# Patient Record
Sex: MALE | Race: WHITE | NOT HISPANIC OR LATINO | Employment: UNEMPLOYED | URBAN - METROPOLITAN AREA
[De-identification: names, ages, dates, MRNs, and addresses within clinical notes are randomized per-mention and may not be internally consistent; named-entity substitution may affect disease eponyms.]

---

## 2022-02-11 ENCOUNTER — HISTORICAL (OUTPATIENT)
Dept: ADMINISTRATIVE | Facility: HOSPITAL | Age: 57
End: 2022-02-11

## 2022-11-30 ENCOUNTER — HOSPITAL ENCOUNTER (EMERGENCY)
Facility: HOSPITAL | Age: 57
Discharge: HOME | End: 2022-11-30
Attending: STUDENT IN AN ORGANIZED HEALTH CARE EDUCATION/TRAINING PROGRAM
Payer: MEDICAID

## 2022-11-30 VITALS
WEIGHT: 145 LBS | SYSTOLIC BLOOD PRESSURE: 136 MMHG | TEMPERATURE: 99 F | RESPIRATION RATE: 18 BRPM | HEART RATE: 80 BPM | DIASTOLIC BLOOD PRESSURE: 82 MMHG | BODY MASS INDEX: 21.48 KG/M2 | HEIGHT: 69 IN | OXYGEN SATURATION: 98 %

## 2022-11-30 DIAGNOSIS — R21 RASH AND NONSPECIFIC SKIN ERUPTION: Primary | ICD-10-CM

## 2022-11-30 DIAGNOSIS — N50.819 TESTICULAR PAIN: ICD-10-CM

## 2022-11-30 DIAGNOSIS — N45.1 EPIDIDYMITIS: ICD-10-CM

## 2022-11-30 LAB
ALBUMIN SERPL-MCNC: 3.1 GM/DL (ref 3.5–5)
ALBUMIN/GLOB SERPL: 0.8 RATIO (ref 1.1–2)
ALP SERPL-CCNC: 78 UNIT/L (ref 40–150)
ALT SERPL-CCNC: 9 UNIT/L (ref 0–55)
AMPHET UR QL SCN: POSITIVE
APPEARANCE UR: CLEAR
AST SERPL-CCNC: 11 UNIT/L (ref 5–34)
BARBITURATE SCN PRESENT UR: NEGATIVE
BASOPHILS # BLD AUTO: 0.06 X10(3)/MCL (ref 0–0.2)
BASOPHILS NFR BLD AUTO: 0.7 %
BENZODIAZ UR QL SCN: NEGATIVE
BILIRUB UR QL STRIP.AUTO: NEGATIVE MG/DL
BILIRUBIN DIRECT+TOT PNL SERPL-MCNC: 0.4 MG/DL
BUN SERPL-MCNC: 13.4 MG/DL (ref 8.4–25.7)
CALCIUM SERPL-MCNC: 9.5 MG/DL (ref 8.4–10.2)
CANNABINOIDS UR QL SCN: NEGATIVE
CHLORIDE SERPL-SCNC: 105 MMOL/L (ref 98–107)
CO2 SERPL-SCNC: 25 MMOL/L (ref 22–29)
COCAINE UR QL SCN: NEGATIVE
COLOR UR AUTO: YELLOW
CREAT SERPL-MCNC: 1.19 MG/DL (ref 0.73–1.18)
EOSINOPHIL # BLD AUTO: 0.29 X10(3)/MCL (ref 0–0.9)
EOSINOPHIL NFR BLD AUTO: 3.3 %
ERYTHROCYTE [DISTWIDTH] IN BLOOD BY AUTOMATED COUNT: 13.1 % (ref 11.5–17)
GFR SERPLBLD CREATININE-BSD FMLA CKD-EPI: >60 MLS/MIN/1.73/M2
GLOBULIN SER-MCNC: 3.7 GM/DL (ref 2.4–3.5)
GLUCOSE SERPL-MCNC: 96 MG/DL (ref 74–100)
GLUCOSE UR QL STRIP.AUTO: NEGATIVE MG/DL
HCT VFR BLD AUTO: 35.6 % (ref 42–52)
HGB BLD-MCNC: 11.1 GM/DL (ref 14–18)
IMM GRANULOCYTES # BLD AUTO: 0.02 X10(3)/MCL (ref 0–0.04)
IMM GRANULOCYTES NFR BLD AUTO: 0.2 %
KETONES UR QL STRIP.AUTO: NEGATIVE MG/DL
LEUKOCYTE ESTERASE UR QL STRIP.AUTO: NEGATIVE UNIT/L
LYMPHOCYTES # BLD AUTO: 1.41 X10(3)/MCL (ref 0.6–4.6)
LYMPHOCYTES NFR BLD AUTO: 16 %
MAGNESIUM SERPL-MCNC: 1.9 MG/DL (ref 1.6–2.6)
MCH RBC QN AUTO: 27 PG (ref 27–31)
MCHC RBC AUTO-ENTMCNC: 31.2 MG/DL (ref 33–36)
MCV RBC AUTO: 86.6 FL (ref 80–94)
MDMA UR QL SCN: NEGATIVE
MONOCYTES # BLD AUTO: 0.77 X10(3)/MCL (ref 0.1–1.3)
MONOCYTES NFR BLD AUTO: 8.7 %
NEUTROPHILS # BLD AUTO: 6.3 X10(3)/MCL (ref 2.1–9.2)
NEUTROPHILS NFR BLD AUTO: 71.1 %
NITRITE UR QL STRIP.AUTO: NEGATIVE
NRBC BLD AUTO-RTO: 0 %
OPIATES UR QL SCN: NEGATIVE
PCP UR QL: NEGATIVE
PH UR STRIP.AUTO: 6 [PH]
PH UR: 6 [PH] (ref 3–11)
PLATELET # BLD AUTO: 351 X10(3)/MCL (ref 130–400)
PMV BLD AUTO: 9.4 FL (ref 7.4–10.4)
POTASSIUM SERPL-SCNC: 4.2 MMOL/L (ref 3.5–5.1)
PROT SERPL-MCNC: 6.8 GM/DL (ref 6.4–8.3)
PROT UR QL STRIP.AUTO: NEGATIVE MG/DL
RBC # BLD AUTO: 4.11 X10(6)/MCL (ref 4.7–6.1)
RBC UR QL AUTO: NEGATIVE UNIT/L
SODIUM SERPL-SCNC: 138 MMOL/L (ref 136–145)
SP GR UR STRIP.AUTO: 1.02
SPECIFIC GRAVITY, URINE AUTO (.000) (OHS): 1.02 (ref 1–1.03)
TSH SERPL-ACNC: 0.32 UIU/ML (ref 0.35–4.94)
UROBILINOGEN UR STRIP-ACNC: 0.2 MG/DL
WBC # SPEC AUTO: 8.8 X10(3)/MCL (ref 4.5–11.5)

## 2022-11-30 PROCEDURE — 96372 THER/PROPH/DIAG INJ SC/IM: CPT | Performed by: STUDENT IN AN ORGANIZED HEALTH CARE EDUCATION/TRAINING PROGRAM

## 2022-11-30 PROCEDURE — 87591 N.GONORRHOEAE DNA AMP PROB: CPT | Performed by: STUDENT IN AN ORGANIZED HEALTH CARE EDUCATION/TRAINING PROGRAM

## 2022-11-30 PROCEDURE — 80307 DRUG TEST PRSMV CHEM ANLYZR: CPT | Performed by: STUDENT IN AN ORGANIZED HEALTH CARE EDUCATION/TRAINING PROGRAM

## 2022-11-30 PROCEDURE — 63600175 PHARM REV CODE 636 W HCPCS: Performed by: STUDENT IN AN ORGANIZED HEALTH CARE EDUCATION/TRAINING PROGRAM

## 2022-11-30 PROCEDURE — 81003 URINALYSIS AUTO W/O SCOPE: CPT | Performed by: STUDENT IN AN ORGANIZED HEALTH CARE EDUCATION/TRAINING PROGRAM

## 2022-11-30 PROCEDURE — 84443 ASSAY THYROID STIM HORMONE: CPT | Performed by: STUDENT IN AN ORGANIZED HEALTH CARE EDUCATION/TRAINING PROGRAM

## 2022-11-30 PROCEDURE — 87491 CHLMYD TRACH DNA AMP PROBE: CPT | Performed by: STUDENT IN AN ORGANIZED HEALTH CARE EDUCATION/TRAINING PROGRAM

## 2022-11-30 PROCEDURE — 80053 COMPREHEN METABOLIC PANEL: CPT | Performed by: STUDENT IN AN ORGANIZED HEALTH CARE EDUCATION/TRAINING PROGRAM

## 2022-11-30 PROCEDURE — 99285 EMERGENCY DEPT VISIT HI MDM: CPT

## 2022-11-30 PROCEDURE — 85025 COMPLETE CBC W/AUTO DIFF WBC: CPT | Performed by: STUDENT IN AN ORGANIZED HEALTH CARE EDUCATION/TRAINING PROGRAM

## 2022-11-30 PROCEDURE — 83735 ASSAY OF MAGNESIUM: CPT | Performed by: STUDENT IN AN ORGANIZED HEALTH CARE EDUCATION/TRAINING PROGRAM

## 2022-11-30 RX ORDER — CEFTRIAXONE 1 G/1
0.5 INJECTION, POWDER, FOR SOLUTION INTRAMUSCULAR; INTRAVENOUS
Status: COMPLETED | OUTPATIENT
Start: 2022-11-30 | End: 2022-11-30

## 2022-11-30 RX ORDER — LEVOFLOXACIN 500 MG/1
500 TABLET, FILM COATED ORAL DAILY
Qty: 10 TABLET | Refills: 0 | Status: SHIPPED | OUTPATIENT
Start: 2022-11-30 | End: 2022-12-10

## 2022-11-30 RX ADMIN — CEFTRIAXONE SODIUM 0.5 G: 1 INJECTION, POWDER, FOR SOLUTION INTRAMUSCULAR; INTRAVENOUS at 11:11

## 2022-12-01 LAB
C TRACH DNA SPEC QL NAA+PROBE: NOT DETECTED
N GONORRHOEA DNA SPEC QL NAA+PROBE: NOT DETECTED

## 2022-12-01 NOTE — ED PROVIDER NOTES
Encounter Date: 11/30/2022       History     Chief Complaint   Patient presents with    Abdominal Pain    Rash     HPI    57-year-old male with past history of hypertension not currently on any medications as well as recent testicular torsion with failed follow-up presents emergency department for 3 complaints.  Firstly he is complaining of some groin pain.  Patient states that he feels like he has a hernia that comes out and causes significant pain to both sides of his groin.  He denies any testicle pain or swelling.  States he did follow-up with the original urologist although missed his follow-up appointment to schedule surgery.  Secondly patient is complaining of a rash to his chest.  States it happens intermittently and causes significant itchiness.  Did not take any medication for this.  Lastly he is complaining of severe fatigue.  Patient states he feels he can not do anything or get out of bed.  States he just feels exhausted.    Review of patient's allergies indicates:  No Known Allergies  Past Medical History:   Diagnosis Date    Essential (primary) hypertension      History reviewed. No pertinent surgical history.  History reviewed. No pertinent family history.  Social History     Tobacco Use    Smoking status: Every Day     Packs/day: 0.50     Types: Cigarettes    Smokeless tobacco: Never   Substance Use Topics    Alcohol use: Not Currently     Review of Systems   Constitutional:  Positive for fatigue. Negative for fever.   Respiratory:  Negative for cough and shortness of breath.    Cardiovascular:  Negative for chest pain.   Gastrointestinal:  Negative for abdominal pain, constipation, diarrhea, nausea and vomiting.   Genitourinary:  Negative for penile swelling, scrotal swelling and testicular pain.        Groin pain   Skin:  Positive for rash.     Physical Exam     Initial Vitals [11/30/22 1950]   BP Pulse Resp Temp SpO2   (!) 139/92 88 20 98.5 °F (36.9 °C) 96 %      MAP       --         Physical  Exam    Nursing note and vitals reviewed.  Constitutional: He appears well-developed and well-nourished. No distress.   Cardiovascular:  Normal rate and regular rhythm.           Pulmonary/Chest: Breath sounds normal. No respiratory distress.   Abdominal: Abdomen is soft. Bowel sounds are normal. There is no abdominal tenderness.   Genitourinary:    Genitourinary Comments: Fullness and tenderness to the spermatic cords bilaterally.  No testicular tenderness     Musculoskeletal:         General: Tenderness present. Normal range of motion.     Neurological: He is alert and oriented to person, place, and time.   Skin: Skin is warm. Capillary refill takes less than 2 seconds. Rash (diffuse erythematous blanching rash noted to the anterior chest.) noted.   Psychiatric: He has a normal mood and affect. Thought content normal.       ED Course   Procedures  Labs Reviewed   COMPREHENSIVE METABOLIC PANEL - Abnormal; Notable for the following components:       Result Value    Creatinine 1.19 (*)     Albumin Level 3.1 (*)     Globulin 3.7 (*)     Albumin/Globulin Ratio 0.8 (*)     All other components within normal limits   TSH - Abnormal; Notable for the following components:    Thyroid Stimulating Hormone 0.3166 (*)     All other components within normal limits   CBC WITH DIFFERENTIAL - Abnormal; Notable for the following components:    RBC 4.11 (*)     Hgb 11.1 (*)     Hct 35.6 (*)     MCHC 31.2 (*)     All other components within normal limits   DRUG SCREEN, URINE (BEAKER) - Abnormal; Notable for the following components:    Amphetamines, Urine Positive (*)     All other components within normal limits    Narrative:     Cut off concentrations:    Amphetamines - 1000 ng/ml  Barbiturates - 200 ng/ml  Benzodiazepine - 200 ng/ml  Cannabinoids (THC) - 50 ng/ml  Cocaine - 300 ng/ml  Fentanyl - 1.0 ng/ml  MDMA - 500 ng/ml  Opiates - 300 ng/ml   Phencyclidine (PCP) - 25 ng/ml    Specimen submitted for drug analysis and tested for  pH and specific gravity in order to evaluate sample integrity. Suspect tampering if specific gravity is <1.003 and/or pH is not within the range of 4.5 - 8.0  False negatives may result form substances such as bleach added to urine.  False positives may result for the presence of a substance with similar chemical structure to the drug or its metabolite.    This test provides only a PRELIMINARY analytical test result. A more specific alternate chemical method must be used in order to obtain a confirmed analytical result. Gas chromatography/mass spectrometry (GC/MS) is the preferred confirmatory method. Other chemical confirmation methods are available. Clinical consideration and professional judgement should be applied to any drug of abuse test result, particularly when preliminary positive results are used.    Positive results will be confirmed only at the physicians request. Unconfirmed screening results are to be used only for medical purposes (treatment).        MAGNESIUM - Normal   CHLAMYDIA/GONORRHOEAE(GC), PCR   CBC W/ AUTO DIFFERENTIAL    Narrative:     The following orders were created for panel order CBC auto differential.  Procedure                               Abnormality         Status                     ---------                               -----------         ------                     CBC with Differential[[id number]]        Abnormal            Final result                 Please view results for these tests on the individual orders.   URINALYSIS, REFLEX TO URINE CULTURE          Imaging Results              US Scrotum And Testicles (Preliminary result)  Result time 11/30/22 23:08:13      Preliminary result by Emilio Aquino Jr., MD (11/30/22 23:08:13)                   Narrative:    START OF REPORT:  Technique: Real time grey scale and color doppler ultrasound was performed on the scrotum and contents.    Comparison: Comparison is with studies dated2022-02-11.    Clinical history: Left  groin/testicular pain.    Findings:  Scrotum:  Right testicle: The right testicle measures 4.1 x 2 x 2.9 cm and appears unremarkable.  Blood flow:  Intratesticular arterial blood flow: There is normal spontaneous and phasic flow.  Intratesticular venous blood flow: There is normal spontaneous and phasic flow.  Right epididymis: Again noted is mildly enlarged and heterogeneous right epididymis with minimal vascularity within it. These findings are similar to the prior examination.  Right hydrocele: There is no right hydrocele.  Right varicocele: There is no right varicocele.  Left testicle: The left testicle measures 4.2 x 1.9 x 2.4 cm. A 1.5 x 1.3 x 1.5 cm thin walled anechoic cyst is seen in the left testicle which appears relatively smaller in size as compared with the prior examination (previously measured 2.3 x 1.7 x 1.7 cm). There is another 1.1 x 1.3 x 1 cm irregular cystic lesioncontiguous with the left testicle which demonstrates subtle internal echoes and focalnodular soft tissue component. This is new since the prior examination. It demonstrates increased peripheral vascularity on Doppler evaluation. An infected epididymal cyst is not excluded.  Left epididymis: The head of the left epididymis appears unremarkable.  Left hydrocele: There is no left hydrocele.  Left varicocele: There is no left varicocele.      Impression:  1. Again noted is mildly enlarged and heterogeneous right epididymis with minimal vascularity within it. These findings are similar to the prior examination.  2. A 1.5 x 1.3 x 1.5 cm thin walled anechoic cyst is seen in the left testicle which appears relatively smaller in size as compared with the prior examination (previously measured 2.3 x 1.7 x 1.7 cm). There is another 1.1 x 1.3 x 1 cm irregular cystic lesioncontiguous with the left testicle which demonstrates subtle internal echoes and focalnodular soft tissue component. This is new since the prior examination. It demonstrates  increased peripheral vascularity on Doppler evaluation. An infected epididymal cyst is not excluded. Correlate with clinical and laboratory findings as regards additional evaluation and follow-up.  3. Details as above.                          Preliminary result by Gilberto, Wilber Results In (11/30/22 23:08:13)                   Narrative:    START OF REPORT:  Technique: Real time grey scale and color doppler ultrasound was performed on the scrotum and contents.    Comparison: Comparison is with studies dated2022-02-11.    Clinical history: Left groin/testicular pain.    Findings:  Scrotum:  Right testicle: The right testicle measures 4.1 x 2 x 2.9 cm and appears unremarkable.  Blood flow:  Intratesticular arterial blood flow: There is normal spontaneous and phasic flow.  Intratesticular venous blood flow: There is normal spontaneous and phasic flow.  Right epididymis: Again noted is mildly enlarged and heterogeneous right epididymis with minimal vascularity within it. These findings are similar to the prior examination.  Right hydrocele: There is no right hydrocele.  Right varicocele: There is no right varicocele.  Left testicle: The left testicle measures 4.2 x 1.9 x 2.4 cm. A 1.5 x 1.3 x 1.5 cm thin walled anechoic cyst is seen in the left testicle which appears relatively smaller in size as compared with the prior examination (previously measured 2.3 x 1.7 x 1.7 cm). There is another 1.1 x 1.3 x 1 cm irregular cystic lesioncontiguous with the left testicle which demonstrates subtle internal echoes and focalnodular soft tissue component. This is new since the prior examination. It demonstrates increased peripheral vascularity on Doppler evaluation. An infected epididymal cyst is not excluded.  Left epididymis: The head of the left epididymis appears unremarkable.  Left hydrocele: There is no left hydrocele.  Left varicocele: There is no left varicocele.      Impression:  1. Again noted is mildly enlarged and  heterogeneous right epididymis with minimal vascularity within it. These findings are similar to the prior examination.  2. A 1.5 x 1.3 x 1.5 cm thin walled anechoic cyst is seen in the left testicle which appears relatively smaller in size as compared with the prior examination (previously measured 2.3 x 1.7 x 1.7 cm). There is another 1.1 x 1.3 x 1 cm irregular cystic lesioncontiguous with the left testicle which demonstrates subtle internal echoes and focalnodular soft tissue component. This is new since the prior examination. It demonstrates increased peripheral vascularity on Doppler evaluation. An infected epididymal cyst is not excluded. Correlate with clinical and laboratory findings as regards additional evaluation and follow-up.  3. Details as above.                                         Medications   cefTRIAXone injection 0.5 g (has no administration in time range)     Medical Decision Making:   Differential Diagnosis:   Fatigue, rash, cellulitis, contact dermatitis, epididymitis, spermatic cord dysfunction           ED Course as of 11/30/22 2312 Wed Nov 30, 2022   2310 Will treat for epididymitis [BS]      ED Course User Index  [BS] Adrian Love MD                 Clinical Impression:   Final diagnoses:  [N50.819] Testicular pain  [R21] Rash and nonspecific skin eruption (Primary)  [N45.1] Epididymitis      ED Disposition Condition    Discharge Stable          ED Prescriptions       Medication Sig Dispense Start Date End Date Auth. Provider    levoFLOXacin (LEVAQUIN) 500 MG tablet Take 1 tablet (500 mg total) by mouth once daily. for 10 days 10 tablet 11/30/2022 12/10/2022 Adrian Love MD          Follow-up Information       Follow up With Specialties Details Why Contact Info    [facility] - Emergency Dept Emergency Medicine Go to  If symptoms worsen [id number]  [facility] [id number] [phone]    Follow-up with your urologist.  Please  call them for follow-up soon                 Adrian Love MD  11/30/22 [id number]

## 2022-12-01 NOTE — ED TRIAGE NOTES
Pt to er with abd pain. Pt states that he has hx of hernia, but missed md appointment. Pt states that he has had rash to chest x 2 weeks

## 2023-01-26 DIAGNOSIS — C18.9 COLON CANCER: Primary | ICD-10-CM

## 2023-02-02 ENCOUNTER — DOCUMENTATION ONLY (OUTPATIENT)
Dept: HEMATOLOGY/ONCOLOGY | Facility: CLINIC | Age: 58
End: 2023-02-02
Payer: MEDICAID

## 2023-02-02 NOTE — NURSING
Attempted pre-visit phone contact to patient, unsuccessful. Message says it is not a working number. Contacted patient's emergency contact, Alma, who reports patient is presently at [facility]. Alma agreed to notify the oncology clinic if patient is admitted to the hospital. Alma says otherwise the patient will attend his appointment on Monday 2/6/23 at 10:40 am.

## 2023-02-03 PROBLEM — C7A.1: Status: ACTIVE | Noted: 2023-02-03

## 2023-02-03 PROBLEM — K76.9 LIVER LESION, RIGHT LOBE: Status: ACTIVE | Noted: 2023-02-03

## 2023-02-03 PROBLEM — C7A.8: Status: ACTIVE | Noted: 2023-02-03

## 2023-02-04 NOTE — PROGRESS NOTES
History:  Past Medical History:   Diagnosis Date    Essential (primary) hypertension    Social history:  Single.  Lives in [place], [place].  Has 3 children.  Not working for last 1 year; previously, used to work in construction.  Has been smoking < a pack of cigarettes daily for 10-15 years.  Used to drink 24 beers daily; drank for 7-8 years, quit 20 years ago.  No illicit drugs.    Family history:  Maternal grandmother experience breast cancer in her 40s.  Health maintenance:  Has no PCP.  Amenable to be referred to Internal Medicine, [facility], to establish with a PCP.  No past surgical history on file.   Social History     Socioeconomic History    Marital status: Single   Tobacco Use    Smoking status: Every Day     Packs/day: 0.50     Types: Cigarettes    Smokeless tobacco: Never   Substance and Sexual Activity    Alcohol use: Not Currently      No family history on file.     Reason for Follow-up:  Reason for consultation:  -well-differentiated neuroendocrine tumor, G3, of right colon/cecum, S/P colectomy 12/18/2022, pT3 pN1 pG3 pR0, MMR proficient  -7 mm lesion right hepatic lobe on CTs    History of Present Illness:   No chief complaint on file.        Oncologic/Hematologic History:  Oncology History    No history exists.   57-year-old gentleman, referred by Dr. Toribio Gómez, [facility], with colon cancer.    Presented to the emergency department with complaints of some right groin pain.  On workup, found to have a large malignant appearing cecal mass.  Colonoscopy confirmed a large mass.  Mass was causing symptoms in the form of pain and neuralgia.  Has a 7 mm nodule in liver which will need further workup on repeat imaging versus biopsy.    10/18/2022:  CT chest with IV contrast (staging evaluation):  -no evidence of metastatic disease in chest   -small sliding hiatal hernia with wall thickening of distal esophagus which may    12/15/2022: CT A/P with IV contrast only (left lower quadrant  abdominal pain):  -moderate to severe masslike thickening seen of cecum, can not rule out underlying neoplastic mass  -7 mm hypodense lesion right hepatic lobe, too small to fully characterize; triphasic CT or MRI could be considered to exclude underlying metastasis    12/17/2022: Colonoscopy (Dr. Jericho Salazar MD) (cecal mass on CT scan):  -cecum:  Large ulcerated mass encompassing 50% of circumference of the lumen on the opposite wall of the IC valve  -ascending colon, transverse colon, descending colon normal; sigmoid colon normal; rectum normal    12/18/2022:  Laparoscopic converted to open right colectomy:   -right lower quadrant showed large malignant-appearing mass arising through the colon and apparently growing into the anterior abdominal wall, requiring transition to open incision (open right colectomy)    Pathology:  -right:, partial colectomy; high-grade colonic neuroendocrine tumor; tumor site, right colon; 8.3 x 7.3 x 6.2 cm; no macroscopic tumor perforation; tumor extends through the muscularis propria; proximal and distal margins free (distance from closest margin 2.1 cm from the mesenteric/radial margin); no lymphovascular invasion; no perineural invasion; no tumor deposits; 1/24 mesenteric lymph nodes + for metastasis  >> pT3 pN1 pG3 pR0    No DNA mismatch repair abnormality noted (MMR proficient).  Low probability of microsatellite instability-high     Comment:  The specimen had an infection of a large bulky mass of colonic adenocarcinoma.  Possibilities include this to be simply a routine colonic adenocarcinoma or a neuroendocrine tumor.  Immunostains were performed.  This represents a high-grade colonic neuroendocrine tumor.  Well-differentiated neuroendocrine tumor, G3 (NET, G3)    -02/02/2023: CT A/P with IV contrast pelvic comparison:  12/15/2022):  -postsurgical changes from cecal mass resection  -no evidence of metastatic disease    02/06/2023:   Presents for initial medical oncology visit.   Accompanied by his sister and daughter.  Pleasant gentleman. , in no acute discomfort.    Overall, feels well.  Recovering from surgery.  Occasional constipation and diarrhea.  No blood in stool.  No abdominal pain.  No fevers or chills.  Some generalized weakness and fatigue.  Appetite is fluctuating.  ECOG 1.  No chest pain, cough, or dyspnea.  No flushing or wheezing.  No dizzy spells.        Interval History:  [No matching plan found]   [No matching plan found]       Medications:  No current outpatient medications on file prior to visit.     No current facility-administered medications on file prior to visit.       Review of Systems:   All systems reviewed and found to be negative except for the symptoms detailed above    Physical Examination:   VITAL SIGNS: There were no vitals filed for this visit.  GENERAL:  In no apparent distress.    HEAD:  No signs of head trauma.  EYES:  Pupils are equal.  Extraocular motions intact.    EARS:  Hearing grossly intact.  MOUTH:  Oropharynx is normal.   NECK:  No adenopathy, no JVD.     CHEST:  Chest with clear breath sounds bilaterally.  No wheezes, rales, rhonchi.    CARDIAC:  Regular rate and rhythm.  S1 and S2, without murmurs, gallops, rubs.  VASCULAR:  No Edema.  Peripheral pulses normal and equal in all extremities.  ABDOMEN:  Soft, without detectable tenderness.  No sign of distention.  No   rebound or guarding, and no masses palpated.   Bowel Sounds normal.  MUSCULOSKELETAL:  Good range of motion of all major joints. Extremities without clubbing, cyanosis or edema.    NEUROLOGIC EXAM:  Alert and oriented x 3.  No focal sensory or strength deficits.   Speech normal.  Follows commands.  PSYCHIATRIC:  Mood normal.    No results for input(s): CBC in the last 72 hours.   No results for input(s): CMP in the last 72 hours.     Assessment:  Problem List Items Addressed This Visit    None  # Well-differentiated, high-grade (NET, G3) neuroendocrine carcinoma of right  colon/cecum:   -7 mm hypodense lesion right hepatic lobe on CT A/P with IV contrast 12/15/2022  -no metastases on CT chest 10/18/2022 and CT A/P 12/15/2022 (except for 7 mm lesion right hepatic lobe)  -S/P colonoscopy 12/17/2022:  Large ulcerated mass encompassing 50% of circumference of the lumen on the opposite side of the IC valve  -S/P right partial colectomy 12/18/2022  -tumor 8.3 x 7.3 x 6.2 cm; pT3, etc.  -1/24 mesenteric lymph nodes + for metastasis  -pT3 pN1 pG3 pR0, AJCC prognostic stage at least IIIB  -MMR proficient         Plan:  For evaluation:  -multiphasic abdominal/pelvic CT or MRI with contrast +/- chest CT as clinically indicated  >>>  Will order multiphasic CT scans of A/P and contrast-enhanced CT scan of chest    Will orders SSTR-PET/CT (68 gallium DOTATATE; or 64Cu Dotatate; or 68 gallium DOTATOC)    24 hour urine for 5-HIAA   Plasma 5-HIAA  Check CBC and CMP today    Test for TMB  Tumor is MMR proficient    If local regional disease confirmed on imaging, then:   Will not need any adjuvant chemotherapy  Will need surveillance: Every 12-24 weeks for 2 years, then every 6-12 months for up to 10 years with history and physical; abdominal/pelvic MRI with contrast or abdominal/pelvic multiphasic CT; CT chest as clinically indicated    Apparently, he has bilateral inguinal hernias which are painful.  He is anxious to get the surgery done.    Explained to him that he will be given all clear for hernia surgery autoimmune make sure that he does not have metastatic disease.    Follow-up in 3 weeks, with results of request investigations.      Above discussed with the patient and his family.  All questions answered.    Discussed labs, scans, pathology report, and staging of neuroendocrine tumors and gave him copies of relevant records.    Discussed plan of management in detail.    He understands and agrees with this plan, and was appreciative.    Follow-up:  No follow-ups on file.

## 2023-02-06 ENCOUNTER — OFFICE VISIT (OUTPATIENT)
Dept: HEMATOLOGY/ONCOLOGY | Facility: CLINIC | Age: 58
End: 2023-02-06
Payer: MEDICAID

## 2023-02-06 VITALS
TEMPERATURE: 97 F | WEIGHT: 139 LBS | HEART RATE: 61 BPM | RESPIRATION RATE: 20 BRPM | DIASTOLIC BLOOD PRESSURE: 100 MMHG | HEIGHT: 65 IN | OXYGEN SATURATION: 99 % | SYSTOLIC BLOOD PRESSURE: 152 MMHG | BODY MASS INDEX: 23.16 KG/M2

## 2023-02-06 DIAGNOSIS — C7A.1 HIGH GRADE NEUROENDOCRINE CARCINOMA OF LARGE INTESTINE: Primary | ICD-10-CM

## 2023-02-06 DIAGNOSIS — C7A.8 PRIMARY MALIGNANT NEUROENDOCRINE TUMOR OF CECUM: ICD-10-CM

## 2023-02-06 DIAGNOSIS — K76.9 LIVER LESION, RIGHT LOBE: ICD-10-CM

## 2023-02-06 DIAGNOSIS — C7A.1 HIGH GRADE NEUROENDOCRINE CARCINOMA OF LARGE INTESTINE: ICD-10-CM

## 2023-02-06 DIAGNOSIS — C18.9 COLON CANCER: ICD-10-CM

## 2023-02-06 LAB
ALBUMIN SERPL-MCNC: 3.4 G/DL (ref 3.5–5)
ALBUMIN/GLOB SERPL: 0.9 RATIO (ref 1.1–2)
ALP SERPL-CCNC: 90 UNIT/L (ref 40–150)
ALT SERPL-CCNC: 16 UNIT/L (ref 0–55)
AST SERPL-CCNC: 13 UNIT/L (ref 5–34)
BASOPHILS # BLD AUTO: 0.06 X10(3)/MCL (ref 0–0.2)
BASOPHILS NFR BLD AUTO: 0.8 %
BILIRUBIN DIRECT+TOT PNL SERPL-MCNC: 0.1 MG/DL
BUN SERPL-MCNC: 13.1 MG/DL (ref 8.4–25.7)
CALCIUM SERPL-MCNC: 9.1 MG/DL (ref 8.4–10.2)
CHLORIDE SERPL-SCNC: 106 MMOL/L (ref 98–107)
CO2 SERPL-SCNC: 25 MMOL/L (ref 22–29)
CREAT SERPL-MCNC: 1.71 MG/DL (ref 0.73–1.18)
EOSINOPHIL # BLD AUTO: 0.28 X10(3)/MCL (ref 0–0.9)
EOSINOPHIL NFR BLD AUTO: 3.8 %
ERYTHROCYTE [DISTWIDTH] IN BLOOD BY AUTOMATED COUNT: 15.8 % (ref 11.5–17)
GFR SERPLBLD CREATININE-BSD FMLA CKD-EPI: 46 MLS/MIN/1.73/M2
GLOBULIN SER-MCNC: 3.9 GM/DL (ref 2.4–3.5)
GLUCOSE SERPL-MCNC: 91 MG/DL (ref 74–100)
HCT VFR BLD AUTO: 36.8 % (ref 42–52)
HGB BLD-MCNC: 11.1 GM/DL (ref 14–18)
IMM GRANULOCYTES # BLD AUTO: 0.01 X10(3)/MCL (ref 0–0.04)
IMM GRANULOCYTES NFR BLD AUTO: 0.1 %
LYMPHOCYTES # BLD AUTO: 2.22 X10(3)/MCL (ref 0.6–4.6)
LYMPHOCYTES NFR BLD AUTO: 30 %
MCH RBC QN AUTO: 24 PG
MCHC RBC AUTO-ENTMCNC: 30.2 MG/DL (ref 33–36)
MCV RBC AUTO: 79.5 FL (ref 80–94)
MONOCYTES # BLD AUTO: 0.57 X10(3)/MCL (ref 0.1–1.3)
MONOCYTES NFR BLD AUTO: 7.7 %
NEUTROPHILS # BLD AUTO: 4.25 X10(3)/MCL (ref 2.1–9.2)
NEUTROPHILS NFR BLD AUTO: 57.6 %
NRBC BLD AUTO-RTO: 0 %
PLATELET # BLD AUTO: 537 X10(3)/MCL (ref 130–400)
PMV BLD AUTO: 9.4 FL (ref 7.4–10.4)
POTASSIUM SERPL-SCNC: 3.8 MMOL/L (ref 3.5–5.1)
PROT SERPL-MCNC: 7.3 GM/DL (ref 6.4–8.3)
RBC # BLD AUTO: 4.63 X10(6)/MCL (ref 4.7–6.1)
SODIUM SERPL-SCNC: 140 MMOL/L (ref 136–145)
WBC # SPEC AUTO: 7.4 X10(3)/MCL (ref 4.5–11.5)

## 2023-02-06 PROCEDURE — 1160F PR REVIEW ALL MEDS BY PRESCRIBER/CLIN PHARMACIST DOCUMENTED: ICD-10-PCS | Mod: CPTII,,, | Performed by: INTERNAL MEDICINE

## 2023-02-06 PROCEDURE — 3080F DIAST BP >= 90 MM HG: CPT | Mod: CPTII,,, | Performed by: INTERNAL MEDICINE

## 2023-02-06 PROCEDURE — 1159F MED LIST DOCD IN RCRD: CPT | Mod: CPTII,,, | Performed by: INTERNAL MEDICINE

## 2023-02-06 PROCEDURE — 3008F BODY MASS INDEX DOCD: CPT | Mod: CPTII,,, | Performed by: INTERNAL MEDICINE

## 2023-02-06 PROCEDURE — 99214 OFFICE O/P EST MOD 30 MIN: CPT | Mod: PBBFAC | Performed by: INTERNAL MEDICINE

## 2023-02-06 PROCEDURE — 1160F RVW MEDS BY RX/DR IN RCRD: CPT | Mod: CPTII,,, | Performed by: INTERNAL MEDICINE

## 2023-02-06 PROCEDURE — 3008F PR BODY MASS INDEX (BMI) DOCUMENTED: ICD-10-PCS | Mod: CPTII,,, | Performed by: INTERNAL MEDICINE

## 2023-02-06 PROCEDURE — 85025 COMPLETE CBC W/AUTO DIFF WBC: CPT | Performed by: INTERNAL MEDICINE

## 2023-02-06 PROCEDURE — 99205 OFFICE O/P NEW HI 60 MIN: CPT | Mod: S$PBB,,, | Performed by: INTERNAL MEDICINE

## 2023-02-06 PROCEDURE — 3080F PR MOST RECENT DIASTOLIC BLOOD PRESSURE >= 90 MM HG: ICD-10-PCS | Mod: CPTII,,, | Performed by: INTERNAL MEDICINE

## 2023-02-06 PROCEDURE — 80053 COMPREHEN METABOLIC PANEL: CPT | Performed by: INTERNAL MEDICINE

## 2023-02-06 PROCEDURE — 3077F SYST BP >= 140 MM HG: CPT | Mod: CPTII,,, | Performed by: INTERNAL MEDICINE

## 2023-02-06 PROCEDURE — 99205 PR OFFICE/OUTPT VISIT, NEW, LEVL V, 60-74 MIN: ICD-10-PCS | Mod: S$PBB,,, | Performed by: INTERNAL MEDICINE

## 2023-02-06 PROCEDURE — 1159F PR MEDICATION LIST DOCUMENTED IN MEDICAL RECORD: ICD-10-PCS | Mod: CPTII,,, | Performed by: INTERNAL MEDICINE

## 2023-02-06 PROCEDURE — 36415 COLL VENOUS BLD VENIPUNCTURE: CPT | Performed by: INTERNAL MEDICINE

## 2023-02-06 PROCEDURE — 3077F PR MOST RECENT SYSTOLIC BLOOD PRESSURE >= 140 MM HG: ICD-10-PCS | Mod: CPTII,,, | Performed by: INTERNAL MEDICINE

## 2023-02-06 RX ORDER — ONDANSETRON 4 MG/1
4 TABLET, ORALLY DISINTEGRATING ORAL EVERY 8 HOURS PRN
COMMUNITY
Start: 2022-12-21

## 2023-02-06 RX ORDER — FAMOTIDINE 20 MG/1
20 TABLET, FILM COATED ORAL
COMMUNITY
Start: 2022-12-21 | End: 2023-02-15 | Stop reason: ALTCHOICE

## 2023-02-06 RX ORDER — OXYCODONE AND ACETAMINOPHEN 5; 325 MG/1; MG/1
1 TABLET ORAL EVERY 4 HOURS PRN
COMMUNITY
Start: 2023-01-25 | End: 2023-02-17 | Stop reason: SDUPTHER

## 2023-02-06 RX ORDER — PROMETHAZINE HYDROCHLORIDE 25 MG/1
25 TABLET ORAL EVERY 6 HOURS PRN
COMMUNITY
Start: 2023-02-02 | End: 2024-01-03

## 2023-02-06 NOTE — Clinical Note
Will order multiphasic CT scans of A/P and contrast-enhanced CT scan of chest  Will orders SSTR-PET/CT (68 gallium DOTATATE; or 64Cu Dotatate; or 68 gallium DOTATOC)  24 hour urine for 5-HIAA  Plasma 5-HIAA Check CBC and CMP today  Test tumor for TMB  Follow-up in 3 weeks.

## 2023-02-07 ENCOUNTER — DOCUMENTATION ONLY (OUTPATIENT)
Dept: HEMATOLOGY/ONCOLOGY | Facility: CLINIC | Age: 58
End: 2023-02-07
Payer: MEDICAID

## 2023-02-07 NOTE — NURSING
Met with patient today after his consult appointment with Dr. Porter. Patient attended appointment accompanied by his sister and daughter. Provided patient with RN Andre contact and explained role in patient's care.    NCCN Distress Screen completed with a reported distress score of 10. Patient indicates his distress is related to his diagnosis and anxiety. Reports he has resources of insurance benefits, SNAP benefits and reliable transportation. Reports that he has started application process for SSDI. Social support reported as adequate. PHQ-9 score of 13. Offered  referral. Patient declined. Says he has enough support with his family. Patient says that he enjoys working and listening to music which helps him cope. Resources folder with written information of community resources, transportation, and financial services given to patient. Informed of [facility] and [employer] referral that he may need to utilize during the course of his treatment. Patient verbalized understanding and agreed to be referred. Patient agrees to contact NATASHA Powell if any needs or concerns arise.     Oncology Navigation   Intake  Cancer Type: GI  Initial Nurse Navigator Contact: 23  Date Worked: 23  Appointment Date: 23     Treatment  Current Status: Staging work-up; Surveillance    Surgery: Completed  Type of Surgery: Laparoscopic converted to open right colectomy:  Surgery Schedule Date: 22    Medical Oncologist: Cristopher Porter MD  Consult Date: 23  Chemotherapy: N/A  Immunotherapy: N/A  Oral Therapy: N/A          General Referrals: [employer]; [facility]          Support Systems: Spouse/significant other; Children; Family members     Acuity  Stage: 2  ECO   Needed: 0  Support: 0  Verbalizes Financial Concerns: 0  Transportation: 0  Mental Health: PHQ Score: 0  Psychological Factors (+1 each): Emotional during conversation  Verbalizes the need for  more education: 0  Navigation Acuity: 4     Follow Up  Follow up in about 1 month (around 3/7/2023) for BH needs, navigation needs.

## 2023-02-14 ENCOUNTER — HOSPITAL ENCOUNTER (OUTPATIENT)
Dept: RADIOLOGY | Facility: HOSPITAL | Age: 58
Discharge: HOME | End: 2023-02-14
Attending: INTERNAL MEDICINE
Payer: MEDICAID

## 2023-02-14 DIAGNOSIS — C7A.8 PRIMARY MALIGNANT NEUROENDOCRINE TUMOR OF CECUM: ICD-10-CM

## 2023-02-14 DIAGNOSIS — C7A.1 HIGH GRADE NEUROENDOCRINE CARCINOMA OF LARGE INTESTINE: ICD-10-CM

## 2023-02-14 PROCEDURE — 25500020 PHARM REV CODE 255: Performed by: INTERNAL MEDICINE

## 2023-02-14 PROCEDURE — 71260 CT THORAX DX C+: CPT | Mod: TC

## 2023-02-14 PROCEDURE — 74178 CT ABD&PLV WO CNTR FLWD CNTR: CPT | Mod: TC

## 2023-02-14 RX ADMIN — IOHEXOL 100 ML: 350 INJECTION, SOLUTION INTRAVENOUS at 10:02

## 2023-02-15 ENCOUNTER — DOCUMENTATION ONLY (OUTPATIENT)
Dept: HEMATOLOGY/ONCOLOGY | Facility: CLINIC | Age: 58
End: 2023-02-15
Payer: MEDICAID

## 2023-02-15 ENCOUNTER — OFFICE VISIT (OUTPATIENT)
Dept: HEMATOLOGY/ONCOLOGY | Facility: CLINIC | Age: 58
End: 2023-02-15
Payer: MEDICAID

## 2023-02-15 VITALS
SYSTOLIC BLOOD PRESSURE: 133 MMHG | HEIGHT: 65 IN | TEMPERATURE: 98 F | BODY MASS INDEX: 22.72 KG/M2 | DIASTOLIC BLOOD PRESSURE: 75 MMHG | HEART RATE: 90 BPM | RESPIRATION RATE: 20 BRPM | OXYGEN SATURATION: 99 % | WEIGHT: 136.38 LBS

## 2023-02-15 DIAGNOSIS — K21.9 GASTROESOPHAGEAL REFLUX DISEASE, UNSPECIFIED WHETHER ESOPHAGITIS PRESENT: ICD-10-CM

## 2023-02-15 DIAGNOSIS — C7A.1 HIGH GRADE NEUROENDOCRINE CARCINOMA OF LARGE INTESTINE: Primary | ICD-10-CM

## 2023-02-15 PROCEDURE — 3078F PR MOST RECENT DIASTOLIC BLOOD PRESSURE < 80 MM HG: ICD-10-PCS | Mod: CPTII,,, | Performed by: NURSE PRACTITIONER

## 2023-02-15 PROCEDURE — 1160F RVW MEDS BY RX/DR IN RCRD: CPT | Mod: CPTII,,, | Performed by: NURSE PRACTITIONER

## 2023-02-15 PROCEDURE — 1159F PR MEDICATION LIST DOCUMENTED IN MEDICAL RECORD: ICD-10-PCS | Mod: CPTII,,, | Performed by: NURSE PRACTITIONER

## 2023-02-15 PROCEDURE — 3078F DIAST BP <80 MM HG: CPT | Mod: CPTII,,, | Performed by: NURSE PRACTITIONER

## 2023-02-15 PROCEDURE — 3075F SYST BP GE 130 - 139MM HG: CPT | Mod: CPTII,,, | Performed by: NURSE PRACTITIONER

## 2023-02-15 PROCEDURE — 3008F PR BODY MASS INDEX (BMI) DOCUMENTED: ICD-10-PCS | Mod: CPTII,,, | Performed by: NURSE PRACTITIONER

## 2023-02-15 PROCEDURE — 3008F BODY MASS INDEX DOCD: CPT | Mod: CPTII,,, | Performed by: NURSE PRACTITIONER

## 2023-02-15 PROCEDURE — 99214 OFFICE O/P EST MOD 30 MIN: CPT | Mod: S$PBB,,, | Performed by: NURSE PRACTITIONER

## 2023-02-15 PROCEDURE — 3075F PR MOST RECENT SYSTOLIC BLOOD PRESS GE 130-139MM HG: ICD-10-PCS | Mod: CPTII,,, | Performed by: NURSE PRACTITIONER

## 2023-02-15 PROCEDURE — 99214 PR OFFICE/OUTPT VISIT, EST, LEVL IV, 30-39 MIN: ICD-10-PCS | Mod: S$PBB,,, | Performed by: NURSE PRACTITIONER

## 2023-02-15 PROCEDURE — 1160F PR REVIEW ALL MEDS BY PRESCRIBER/CLIN PHARMACIST DOCUMENTED: ICD-10-PCS | Mod: CPTII,,, | Performed by: NURSE PRACTITIONER

## 2023-02-15 PROCEDURE — 99213 OFFICE O/P EST LOW 20 MIN: CPT | Mod: PBBFAC | Performed by: NURSE PRACTITIONER

## 2023-02-15 PROCEDURE — 1159F MED LIST DOCD IN RCRD: CPT | Mod: CPTII,,, | Performed by: NURSE PRACTITIONER

## 2023-02-15 RX ORDER — PANTOPRAZOLE SODIUM 40 MG/1
40 TABLET, DELAYED RELEASE ORAL DAILY
Qty: 30 TABLET | Refills: 0 | Status: SHIPPED | OUTPATIENT
Start: 2023-02-15 | End: 2023-07-18

## 2023-02-15 NOTE — PROGRESS NOTES
"  Reason for Visit:  Nutrition Referral for nutrition assessment      PMHx:   Past Medical History:   Diagnosis Date    Essential (primary) hypertension        Height: 69"  Weight:   Wt Readings from Last 15 Encounters:   02/15/23 61.9 kg (136 lb 6.4 oz)   02/06/23 63 kg (139 lb)   11/30/22 65.8 kg (145 lb)       Usual BW: 145 lb  Weight Change: 6% wt loss x 1 month  IBW:  160 lb    BMI: 20.2 (normal)    Allergies: Patient has no known allergies.    Current Medications:    Current Outpatient Medications:     famotidine (PEPCID) 20 MG tablet, Take 20 mg by mouth., Disp: , Rfl:     ondansetron (ZOFRAN-ODT) 4 MG TbDL, Take 4 mg by mouth every 8 (eight) hours as needed., Disp: , Rfl:     oxyCODONE-acetaminophen (PERCOCET) 5-325 mg per tablet, Take 1 tablet by mouth every 4 (four) hours as needed., Disp: , Rfl:     promethazine (PHENERGAN) 25 MG tablet, Take 25 mg by mouth every 6 (six) hours as needed., Disp: , Rfl:   No current facility-administered medications for this visit.    Labs:  2/15/23 -- H/H 11.1/36 L, Na 137, K 4.2, BUN 21.8, Cr 1.29 H, Alb 3.5    Diet History:   2/15/23 -- Pt reports good appetite eating 3-4 x daily with Ensure or Boost TID -- meals + oral nutrition supplement meeting nutrition needs; wt loss reported following surgery in Jan > 5% x 1 month, Continue ONS to supplement diet & meet nutrition needs; pt denies n/v or abdominal pain; reports occasional loose stool that is manageable, encouraged water intake; Pt screens at moderate nutrition risk at this time    Nutrition Diagnosis:   1.)Problem:   unintentional wt loss  Etiology (related to): tumor location and Colon CA  s/p colectomy  Signs/Symptoms (as evidenced by):  >5% wt loss x 1 month       Nutrition Rx: based on current wt 62 kg  EEN: 1735 - 1860 kcal (28 - 30 kcal/kg)  EPN: 62-74 gm (1 - 1.2 gm/kg)  FLD: 8650-9456 ml (1ml/kcal)    Intervention:  General Healthy diet  Ensure Plus or equivalent TID  Monitor Weights Weekly "       Monitoring:  energy intake, GI tolerance, weight, and labs

## 2023-02-15 NOTE — PROGRESS NOTES
Reason for Follow-up:  Reason for consultation:  -well-differentiated neuroendocrine tumor, G3, of right colon/cecum, S/P colectomy 12/18/2022, pT3 pN1 pG3 pR0, MMR proficient  -7 mm lesion right hepatic lobe on Cts    Current Treatment:    Treatment History:  12/18/2022:  Laparoscopic converted to open right colectomy:     Social history:  Single.  Lives in [place], [place].  Has 3 children.  Not working for last 1 year; previously, used to work in construction.  Has been smoking < a pack of cigarettes daily for 10-15 years.  Used to drink 24 beers daily; drank for 7-8 years, quit 20 years ago.  No illicit drugs.    Family history:  Maternal grandmother experience breast cancer in her 40s.  Health maintenance:  Has no PCP.  Amenable to be referred to Internal Medicine, [facility], to establish with a PCP.    History of Present Illness:   57-year-old gentleman, referred by Dr. Troibio Gómez, [facility], with colon cancer.    Presented to the emergency department with complaints of some right groin pain.  On workup, found to have a large malignant appearing cecal mass.  Colonoscopy confirmed a large mass.  Mass was causing symptoms in the form of pain and neuralgia.  Has a 7 mm nodule in liver which will need further workup on repeat imaging versus biopsy.    10/18/2022:  CT chest with IV contrast (staging evaluation):  -no evidence of metastatic disease in chest   -small sliding hiatal hernia with wall thickening of distal esophagus which may    12/15/2022: CT A/P with IV contrast only (left lower quadrant abdominal pain):  -moderate to severe masslike thickening seen of cecum, can not rule out underlying neoplastic mass  -7 mm hypodense lesion right hepatic lobe, too small to fully characterize; triphasic CT or MRI could be considered to exclude underlying metastasis    12/17/2022: Colonoscopy (Dr. Jericho Salazar MD) (cecal mass on CT scan):  -cecum:  Large ulcerated mass encompassing 50% of  circumference of the lumen on the opposite wall of the IC valve  -ascending colon, transverse colon, descending colon normal; sigmoid colon normal; rectum normal    12/18/2022:  Laparoscopic converted to open right colectomy:   -right lower quadrant showed large malignant-appearing mass arising through the colon and apparently growing into the anterior abdominal wall, requiring transition to open incision (open right colectomy)    Pathology:  -right:, partial colectomy; high-grade colonic neuroendocrine tumor; tumor site, right colon; 8.3 x 7.3 x 6.2 cm; no macroscopic tumor perforation; tumor extends through the muscularis propria; proximal and distal margins free (distance from closest margin 2.1 cm from the mesenteric/radial margin); no lymphovascular invasion; no perineural invasion; no tumor deposits; 1/24 mesenteric lymph nodes + for metastasis  >> pT3 pN1 pG3 pR0    No DNA mismatch repair abnormality noted (MMR proficient).  Low probability of microsatellite instability-high     Comment:  The specimen had an infection of a large bulky mass of colonic adenocarcinoma.  Possibilities include this to be simply a routine colonic adenocarcinoma or a neuroendocrine tumor.  Immunostains were performed.  This represents a high-grade colonic neuroendocrine tumor.  Well-differentiated neuroendocrine tumor, G3 (NET, G3)    -02/02/2023: CT A/P with IV contrast pelvic comparison:  12/15/2022):  -postsurgical changes from cecal mass resection  -no evidence of metastatic disease      Interval History 2/15/2023: Patient presents along with his wife for scheduled follow up for Ever. He recently completed CT C/A/P and results reviewed with patient. Patient voiced his concern of iguinal hernia repair and that they are painful. Patient aware that decision for hernia surgery would be based off of metastatic process which scans showed no signs of metastasis. Lab work reviewed with patient,stable slightly elevated creat. Level. He  has not completed 24 hour urine for 5HIAA patient says he will attempt to complete this week. Informed patient that in order for Plasma 5HIAA to be completed he must be fasting. Patient says that he is still very sore from surgery area and at times experience a burning sensation mid abdomen. He is currently not on any PPI and has not taken anything for acid reducer Otc. He also was concerned with surgery scar feeling harder. Discussed with patient that this was most likely due to surgery scarring and inflammation and would improve over time. Patient verbalized understanding.       Review of Systems:   All systems reviewed and found to be negative except for the symptoms detailed above    Lab Results   Component Value Date    WBC 5.3 02/15/2023    RBC 4.62 (L) 02/15/2023    HGB 11.1 (L) 02/15/2023    HCT 36.0 (L) 02/15/2023    MCV 77.9 (L) 02/15/2023    MCH 24.0 02/15/2023    MCHC 30.8 (L) 02/15/2023    RDW 15.9 02/15/2023     02/15/2023    MPV 9.4 02/15/2023     CMP  Sodium Level   Date Value Ref Range Status   02/15/2023 137 136 - 145 mmol/L Final     Potassium Level   Date Value Ref Range Status   02/15/2023 4.2 3.5 - 5.1 mmol/L Final     Carbon Dioxide   Date Value Ref Range Status   02/15/2023 22 22 - 29 mmol/L Final     Blood Urea Nitrogen   Date Value Ref Range Status   02/15/2023 21.8 8.4 - 25.7 mg/dL Final     Creatinine   Date Value Ref Range Status   02/15/2023 1.29 (H) 0.73 - 1.18 mg/dL Final     Calcium Level Total   Date Value Ref Range Status   02/15/2023 9.1 8.4 - 10.2 mg/dL Final     Albumin Level   Date Value Ref Range Status   02/15/2023 3.5 3.5 - 5.0 g/dL Final     Bilirubin Total   Date Value Ref Range Status   02/15/2023 0.1 <=1.5 mg/dL Final     Alkaline Phosphatase   Date Value Ref Range Status   02/15/2023 79 40 - 150 unit/L Final     Aspartate Aminotransferase   Date Value Ref Range Status   02/15/2023 16 5 - 34 unit/L Final     Alanine Aminotransferase   Date Value Ref Range Status    02/15/2023 17 0 - 55 unit/L Final     eGFR   Date Value Ref Range Status   02/15/2023 >60 mls/min/1.73/m2 Final       Physical Examination:   VITAL SIGNS:   Vitals:    02/15/23 0916   BP: 133/75   Pulse: 90   Resp: 20   Temp: 97.5 °F (36.4 °C)     General: Alert and oriented. NAD  Eye: Pupils are equal, round and reactive to light, Extraocular movements are intact. Normal conjunctiva  HENT: Normocephalic. Oropharynx exam deferred; mask in place due to coronavirus  Neck: Supple, Non-tender  Respiratory: Respirations are non-labored, Symmetrical chest wall expansion. Breath sounds CTA bilaterally  Cardiovascular: Regular rate, rhythm, Normal peripheral perfusion, No bilateral lower extremity edema  Gastrointestinal: Non-distended, Present bowel sounds   Genitourinary: Exam deferred  Lymphatics: No lymphadenopathy appreciated  Musculoskeletal: Moves all extremities ambulates with assistance  Integumentary: Intact. Warm, dry. No rashes, or lesions to visible skin  Neurologic: No focal deficits  Psychiatric: Cooperative. Appropriate mood and affect   ECOG Performance Scale: 1 - Capable of all self-care able to carry out light work activities  2/15/23: left medium size bulging inguinal hernia noted    Assessment:  # Well-differentiated, high-grade (NET, G3) neuroendocrine carcinoma of right colon/cecum:   -7 mm hypodense lesion right hepatic lobe on CT A/P with IV contrast 12/15/2022  -no metastases on CT chest 10/18/2022 and CT A/P 12/15/2022 (except for 7 mm lesion right hepatic lobe)  -S/P colonoscopy 12/17/2022:  Large ulcerated mass encompassing 50% of circumference of the lumen on the opposite side of the IC valve  -S/P right partial colectomy 12/18/2022  -tumor 8.3 x 7.3 x 6.2 cm; pT3, etc.  -1/24 mesenteric lymph nodes + for metastasis  -pT3 pN1 pG3 pR0, AJCC prognostic stage at least IIIB  -MMR proficient         multiphasic CT scans of A/P and contrast-enhanced CT scan of chest   2/14/2023:   -Interval postop  changes at the cecum.  No definite CT evidence for metastatic disease to the abdomen pelvis.  Stable mild diffuse bladder wall thickening.   -Multiple scattered pulmonary nodules ranging in size from 3-4.5 mm.    Plan:  For evaluation:  -multiphasic abdominal/pelvic CT or MRI with contrast +/- chest CT as clinically indicated  >>>  Will order complet  Will orders SSTR-PET/CT (68 gallium DOTATATE; or 64Cu Dotatate; or 68 gallium DOTATOC)-not covered on insurance    24 hour urine for 5-HIAA pending  Plasma [id number] not collected patient must be fasting overnight    Test for TMB  Tumor is MMR proficient    If local regional disease confirmed on imaging, then:   Will not need any adjuvant chemotherapy  Will need surveillance: Every 12-24 weeks for 2 years, then every 6-12 months for up to 10 years with history and physical; abdominal/pelvic MRI with contrast or abdominal/pelvic multiphasic CT; CT chest as clinically indicated    Apparently, he has bilateral inguinal hernias which are painful.  He is anxious to get the surgery done.    Explained to him that he will be given all clear for hernia surgery once ensured that he does not have metastatic disease.    Follow-up w/ as scheduled to review investigational studies  RTC for 5-HIAA plasma (fasting required), and to bring in 5HIAA 24 hour urine   Start Protonix 40mg po once daily-rx sent to pharmacy   Increase hydration with water due to slight elevation in creat. level    Above discussed with the patient and his family.  All questions answered.    Discussed labs, scans, and staging of neuroendocrine tumors and gave him copies of relevant records.      He understands and agrees with this plan, and was appreciative.

## 2023-02-16 ENCOUNTER — TELEPHONE (OUTPATIENT)
Dept: HEMATOLOGY/ONCOLOGY | Facility: CLINIC | Age: 58
End: 2023-02-16
Payer: MEDICAID

## 2023-02-17 ENCOUNTER — CLINICAL SUPPORT (OUTPATIENT)
Dept: HEMATOLOGY/ONCOLOGY | Facility: CLINIC | Age: 58
End: 2023-02-17
Payer: MEDICAID

## 2023-02-17 DIAGNOSIS — C7A.8 PRIMARY MALIGNANT NEUROENDOCRINE TUMOR OF CECUM: ICD-10-CM

## 2023-02-17 DIAGNOSIS — C7A.1 HIGH GRADE NEUROENDOCRINE CARCINOMA OF LARGE INTESTINE: Primary | ICD-10-CM

## 2023-02-17 DIAGNOSIS — K76.9 LIVER LESION, RIGHT LOBE: ICD-10-CM

## 2023-02-17 DIAGNOSIS — C7A.1 HIGH GRADE NEUROENDOCRINE CARCINOMA OF LARGE INTESTINE: ICD-10-CM

## 2023-02-17 LAB
ALBUMIN SERPL-MCNC: 4 G/DL (ref 3.5–5)
ALBUMIN/GLOB SERPL: 1.1 RATIO (ref 1.1–2)
ALP SERPL-CCNC: 86 UNIT/L (ref 40–150)
ALT SERPL-CCNC: 15 UNIT/L (ref 0–55)
AST SERPL-CCNC: 18 UNIT/L (ref 5–34)
BASOPHILS # BLD AUTO: 0.06 X10(3)/MCL (ref 0–0.2)
BASOPHILS NFR BLD AUTO: 0.8 %
BILIRUBIN DIRECT+TOT PNL SERPL-MCNC: 0.3 MG/DL
BUN SERPL-MCNC: 16.3 MG/DL (ref 8.4–25.7)
CALCIUM SERPL-MCNC: 9.6 MG/DL (ref 8.4–10.2)
CHLORIDE SERPL-SCNC: 106 MMOL/L (ref 98–107)
CO2 SERPL-SCNC: 24 MMOL/L (ref 22–29)
CREAT SERPL-MCNC: 1.15 MG/DL (ref 0.73–1.18)
EOSINOPHIL # BLD AUTO: 0.21 X10(3)/MCL (ref 0–0.9)
EOSINOPHIL NFR BLD AUTO: 2.9 %
ERYTHROCYTE [DISTWIDTH] IN BLOOD BY AUTOMATED COUNT: 16.3 % (ref 11.5–17)
GFR SERPLBLD CREATININE-BSD FMLA CKD-EPI: >60 MLS/MIN/1.73/M2
GLOBULIN SER-MCNC: 3.7 GM/DL (ref 2.4–3.5)
GLUCOSE SERPL-MCNC: 91 MG/DL (ref 74–100)
HCT VFR BLD AUTO: 37.5 % (ref 42–52)
HGB BLD-MCNC: 11.5 GM/DL (ref 14–18)
IMM GRANULOCYTES # BLD AUTO: 0.01 X10(3)/MCL (ref 0–0.04)
IMM GRANULOCYTES NFR BLD AUTO: 0.1 %
LYMPHOCYTES # BLD AUTO: 1.7 X10(3)/MCL (ref 0.6–4.6)
LYMPHOCYTES NFR BLD AUTO: 23.8 %
MCH RBC QN AUTO: 23.7 PG
MCHC RBC AUTO-ENTMCNC: 30.7 MG/DL (ref 33–36)
MCV RBC AUTO: 77.3 FL (ref 80–94)
MONOCYTES # BLD AUTO: 0.43 X10(3)/MCL (ref 0.1–1.3)
MONOCYTES NFR BLD AUTO: 6 %
NEUTROPHILS # BLD AUTO: 4.72 X10(3)/MCL (ref 2.1–9.2)
NEUTROPHILS NFR BLD AUTO: 66.4 %
NRBC BLD AUTO-RTO: 0 %
PLATELET # BLD AUTO: 341 X10(3)/MCL (ref 130–400)
PMV BLD AUTO: 9.6 FL (ref 7.4–10.4)
POTASSIUM SERPL-SCNC: 4.3 MMOL/L (ref 3.5–5.1)
PROT SERPL-MCNC: 7.7 GM/DL (ref 6.4–8.3)
RBC # BLD AUTO: 4.85 X10(6)/MCL (ref 4.7–6.1)
SODIUM SERPL-SCNC: 140 MMOL/L (ref 136–145)
WBC # SPEC AUTO: 7.1 X10(3)/MCL (ref 4.5–11.5)

## 2023-02-17 PROCEDURE — 83497 ASSAY OF 5-HIAA: CPT

## 2023-02-17 PROCEDURE — 36415 COLL VENOUS BLD VENIPUNCTURE: CPT

## 2023-02-17 PROCEDURE — 80053 COMPREHEN METABOLIC PANEL: CPT

## 2023-02-17 PROCEDURE — 85025 COMPLETE CBC W/AUTO DIFF WBC: CPT

## 2023-02-17 RX ORDER — OXYCODONE AND ACETAMINOPHEN 5; 325 MG/1; MG/1
1 TABLET ORAL EVERY 4 HOURS PRN
Qty: 30 TABLET | Refills: 0 | Status: SHIPPED | OUTPATIENT
Start: 2023-02-17 | End: 2023-11-13

## 2023-02-21 LAB
5OH-INDOLEACETATE 24H UR-MRATE: 1.4 MG/24 H
COLLECT DURATION TIME UR: 24 H
SPECIMEN VOL 24H UR: 600 ML

## 2023-02-24 ENCOUNTER — TELEPHONE (OUTPATIENT)
Dept: HEMATOLOGY/ONCOLOGY | Facility: CLINIC | Age: 58
End: 2023-02-24
Payer: MEDICAID

## 2023-02-26 PROBLEM — R91.8 LUNG NODULES: Status: ACTIVE | Noted: 2023-02-26

## 2023-02-27 ENCOUNTER — OFFICE VISIT (OUTPATIENT)
Dept: HEMATOLOGY/ONCOLOGY | Facility: CLINIC | Age: 58
End: 2023-02-27
Attending: INTERNAL MEDICINE
Payer: MEDICAID

## 2023-02-27 VITALS
BODY MASS INDEX: 22.33 KG/M2 | WEIGHT: 134 LBS | RESPIRATION RATE: 20 BRPM | HEIGHT: 65 IN | OXYGEN SATURATION: 100 % | DIASTOLIC BLOOD PRESSURE: 96 MMHG | SYSTOLIC BLOOD PRESSURE: 134 MMHG | HEART RATE: 87 BPM | TEMPERATURE: 98 F

## 2023-02-27 DIAGNOSIS — R91.8 LUNG NODULES: ICD-10-CM

## 2023-02-27 DIAGNOSIS — C7A.1 HIGH GRADE NEUROENDOCRINE CARCINOMA OF LARGE INTESTINE: Primary | ICD-10-CM

## 2023-02-27 DIAGNOSIS — K76.9 LIVER LESION, RIGHT LOBE: ICD-10-CM

## 2023-02-27 DIAGNOSIS — C7A.8 PRIMARY MALIGNANT NEUROENDOCRINE TUMOR OF CECUM: ICD-10-CM

## 2023-02-27 PROCEDURE — 3080F PR MOST RECENT DIASTOLIC BLOOD PRESSURE >= 90 MM HG: ICD-10-PCS | Mod: CPTII,,, | Performed by: INTERNAL MEDICINE

## 2023-02-27 PROCEDURE — 99214 OFFICE O/P EST MOD 30 MIN: CPT | Mod: S$PBB,,, | Performed by: INTERNAL MEDICINE

## 2023-02-27 PROCEDURE — 3008F PR BODY MASS INDEX (BMI) DOCUMENTED: ICD-10-PCS | Mod: CPTII,,, | Performed by: INTERNAL MEDICINE

## 2023-02-27 PROCEDURE — 3008F BODY MASS INDEX DOCD: CPT | Mod: CPTII,,, | Performed by: INTERNAL MEDICINE

## 2023-02-27 PROCEDURE — 3075F SYST BP GE 130 - 139MM HG: CPT | Mod: CPTII,,, | Performed by: INTERNAL MEDICINE

## 2023-02-27 PROCEDURE — 99213 OFFICE O/P EST LOW 20 MIN: CPT | Mod: PBBFAC | Performed by: INTERNAL MEDICINE

## 2023-02-27 PROCEDURE — 99214 PR OFFICE/OUTPT VISIT, EST, LEVL IV, 30-39 MIN: ICD-10-PCS | Mod: S$PBB,,, | Performed by: INTERNAL MEDICINE

## 2023-02-27 PROCEDURE — 1160F RVW MEDS BY RX/DR IN RCRD: CPT | Mod: CPTII,,, | Performed by: INTERNAL MEDICINE

## 2023-02-27 PROCEDURE — 1159F MED LIST DOCD IN RCRD: CPT | Mod: CPTII,,, | Performed by: INTERNAL MEDICINE

## 2023-02-27 PROCEDURE — 1160F PR REVIEW ALL MEDS BY PRESCRIBER/CLIN PHARMACIST DOCUMENTED: ICD-10-PCS | Mod: CPTII,,, | Performed by: INTERNAL MEDICINE

## 2023-02-27 PROCEDURE — 3080F DIAST BP >= 90 MM HG: CPT | Mod: CPTII,,, | Performed by: INTERNAL MEDICINE

## 2023-02-27 PROCEDURE — 3075F PR MOST RECENT SYSTOLIC BLOOD PRESS GE 130-139MM HG: ICD-10-PCS | Mod: CPTII,,, | Performed by: INTERNAL MEDICINE

## 2023-02-27 PROCEDURE — 1159F PR MEDICATION LIST DOCUMENTED IN MEDICAL RECORD: ICD-10-PCS | Mod: CPTII,,, | Performed by: INTERNAL MEDICINE

## 2023-02-27 NOTE — Clinical Note
Orders for today:  Inhibitor of May:  CBC, CMP, 24 hour urine for 5-HIAA level  Multiphasic contrast-enhanced CT scans of A/P  Contrast-enhanced CT scan of chest   Then follow-up visit.

## 2023-02-27 NOTE — PROGRESS NOTES
History:  Past Medical History:   Diagnosis Date    Essential (primary) hypertension    Social history:  Single.  Lives in [place], [place].  Has 3 children.  Not working for last 1 year; previously, used to work in construction.  Has been smoking < a pack of cigarettes daily for 10-15 years.  Used to drink 24 beers daily; drank for 7-8 years, quit 20 years ago.  No illicit drugs.    Family history:  Maternal grandmother experience breast cancer in her 40s.  Health maintenance:  Has no PCP.  Amenable to be referred to Internal Medicine, [facility], to establish with a PCP.  History reviewed. No pertinent surgical history.   Social History     Socioeconomic History    Marital status: Single   Tobacco Use    Smoking status: Every Day     Packs/day: 0.50     Types: Cigarettes    Smokeless tobacco: Never   Substance and Sexual Activity    Alcohol use: Not Currently      History reviewed. No pertinent family history.     Reason for Follow-up:  -well-differentiated neuroendocrine tumor, G3, of right colon/cecum, S/P colectomy 12/18/2022, pT3 pN1 pG3 pR0, at least stage IIIB, MMR proficient  -7 mm lesion right hepatic lobe on Cts  -lung nodules    History of Present Illness:   Results        Oncologic/Hematologic History:  Oncology History   High grade neuroendocrine carcinoma of large intestine   12/18/2022 Cancer Staged    Staging form: Colon and Rectum - Neuroendocine Tumors, AJCC 8th Edition  - Pathologic stage from 12/18/2022: Stage IIIB (pT3, pN1, cM0)       2/3/2023 Initial Diagnosis    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 Toribio Gómez, [facility], with colon cancer.    Presented to the emergency department with complaints of some right groin pain.  On workup, found to have a large malignant appearing cecal mass.  Colonoscopy confirmed a large mass.  Mass was causing symptoms in the form of pain and neuralgia.  Has a 7 mm nodule in liver  which will need further workup on repeat imaging versus biopsy.    10/18/2022:  CT chest with IV contrast (staging evaluation):  -no evidence of metastatic disease in chest   -small sliding hiatal hernia with wall thickening of distal esophagus which may    12/15/2022: CT A/P with IV contrast only (left lower quadrant abdominal pain):  -moderate to severe masslike thickening seen of cecum, can not rule out underlying neoplastic mass  -7 mm hypodense lesion right hepatic lobe, too small to fully characterize; triphasic CT or MRI could be considered to exclude underlying metastasis    12/17/2022: Colonoscopy (Dr. Jericho Salazar MD) (cecal mass on CT scan):  -cecum:  Large ulcerated mass encompassing 50% of circumference of the lumen on the opposite wall of the IC valve  -ascending colon, transverse colon, descending colon normal; sigmoid colon normal; rectum normal    12/18/2022:  Laparoscopic converted to open right colectomy:   -right lower quadrant showed large malignant-appearing mass arising through the colon and apparently growing into the anterior abdominal wall, requiring transition to open incision (open right colectomy)    Pathology:  -right:, partial colectomy; high-grade colonic neuroendocrine tumor; tumor site, right colon; 8.3 x 7.3 x 6.2 cm; no macroscopic tumor perforation; tumor extends through the muscularis propria; proximal and distal margins free (distance from closest margin 2.1 cm from the mesenteric/radial margin); no lymphovascular invasion; no perineural invasion; no tumor deposits; 1/24 mesenteric lymph nodes + for metastasis  >> pT3 pN1 pG3 pR0    No DNA mismatch repair abnormality noted (MMR proficient).  Low probability of microsatellite instability-high     Comment:  The specimen had an infection of a large bulky mass of colonic adenocarcinoma.  Possibilities include this to be simply a routine colonic adenocarcinoma or a neuroendocrine tumor.  Immunostains were performed.  This  represents a high-grade colonic neuroendocrine tumor.  Well-differentiated neuroendocrine tumor, G3 (NET, G3)    -02/02/2023: CT A/P with IV contrast pelvic comparison:  12/15/2022):  -postsurgical changes from cecal mass resection  -no evidence of metastatic disease    02/06/2023:   Presents for initial medical oncology visit.  Accompanied by his sister and daughter.  Pleasant gentleman. , in no acute discomfort.    Overall, feels well.  Recovering from surgery.  Occasional constipation and diarrhea.  No blood in stool.  No abdominal pain.  No fevers or chills.  Some generalized weakness and fatigue.  Appetite is fluctuating.  ECOG 1.  No chest pain, cough, or dyspnea.  No flushing or wheezing.  No dizzy spells.        Interval History:  [No matching plan found]   [No matching plan found]   02/27/2023:  -02/14/2023: CT chest with contrast for staging:  Multiple scattered lung nodules ranging in size from 3-4.5 mm  -02/14/2023:  CT A/P with and without contrast (comparison:  02/11/2022):  No metastatic disease to A/P; interval postoperative changes at the cecum  -medicate denied our request for SSTR-PET/CT (68 gallium DOTATATE; or 64Cu Dotatate; or 68 gallium DOTATOC)  -02/06/2023:  Hemoglobin 11.1.  MCV 79.5.  Creatinine 1.71.  -02/15/2023:  Hemoglobin 11.1.  MCV 77.9.  Creatinine 1.29.  -02/17/2023:  Hemoglobin 11.5.  MCV 77.3.  Creatinine 1.15, normal.  -02/17/2023:  24 hour urine 5-HIAA level: 1.4 mg, normal (reference Range: =/< 9.8 mg/24 hours)  Presents for a follow-up visit, accompanied by a female .  In a wheelchair.  Anxious to get hernia surgery done.  Continues to smoke a pack of cigarettes daily.  Chronic stable symptoms include weakness, dyspnea, cough, abdominal pain, and numbness and tingling in legs.  Generalized pains, 4/10 severity.  Requesting Percocet which, we respectfully declined.      Medications:  Current Outpatient Medications on File Prior to Visit   Medication Sig Dispense  Refill    ondansetron (ZOFRAN-ODT) 4 MG TbDL Take 4 mg by mouth every 8 (eight) hours as needed.      oxyCODONE-acetaminophen (PERCOCET) 5-325 mg per tablet Take 1 tablet by mouth every 4 (four) hours as needed for Pain. 30 tablet 0    pantoprazole (PROTONIX) 40 MG tablet Take 1 tablet (40 mg total) by mouth once daily. 30 tablet 0    promethazine (PHENERGAN) 25 MG tablet Take 25 mg by mouth every 6 (six) hours as needed.       No current facility-administered medications on file prior to visit.       Review of Systems:   All systems reviewed and found to be negative except for the symptoms detailed above    Physical Examination:   VITAL SIGNS:   Vitals:    02/27/23 1029   BP: (!) 134/96   Pulse: 87   Resp: 20   Temp: 97.8 °F (36.6 °C)     GENERAL:  In no apparent distress.    HEAD:  No signs of head trauma.  EYES:  Pupils are equal.  Extraocular motions intact.    EARS:  Hearing grossly intact.  MOUTH:  Oropharynx is normal.   NECK:  No adenopathy, no JVD.     CHEST:  Chest with clear breath sounds bilaterally.  No wheezes, rales, rhonchi.    CARDIAC:  Regular rate and rhythm.  S1 and S2, without murmurs, gallops, rubs.  VASCULAR:  No Edema.  Peripheral pulses normal and equal in all extremities.  ABDOMEN:  Soft, without detectable tenderness.  No sign of distention.  No   rebound or guarding, and no masses palpated.   Bowel Sounds normal.  MUSCULOSKELETAL:  Good range of motion of all major joints. Extremities without clubbing, cyanosis or edema.    NEUROLOGIC EXAM:  Alert and oriented x 3.  No focal sensory or strength deficits.   Speech normal.  Follows commands.  PSYCHIATRIC:  Mood normal.    No results for input(s): CBC in the last 72 hours.   No results for input(s): CMP in the last 72 hours.     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 - Primary    Primary malignant neuroendocrine tumor of  cecum       GI    Liver lesion, right lobe     Well-differentiated, high-grade (NET, G3) neuroendocrine carcinoma of right colon/cecum:   -7 mm hypodense lesion right hepatic lobe on CT A/P with IV contrast 12/15/2022 (not visualized on subsequent CT A/P with contrast 02/14/2023)  -no metastases on CT chest 10/18/2022 and CT A/P 12/15/2022 (except for 7 mm lesion right hepatic lobe)  -S/P colonoscopy 12/17/2022:  Large ulcerated mass encompassing 50% of circumference of the lumen on the opposite side of the IC valve  -S/P right partial colectomy 12/18/2022  -tumor 8.3 x 7.3 x 6.2 cm; pT3, etc.  -1/24 mesenteric lymph nodes + for metastasis  -pT3 pN1 M0 pG3 pR0, AJCC prognostic stage IIIB  -MMR proficient     -no metastases on staging CTs C/A/P with contrast 02/14/2023   -7 mm hypodense lesion right hepatic lobe noted on CT A/P with IV contrast 12/15/2022, not visualized on CTs A/P with contrast 02/14/2023  -multiple scattered small lung nodules on CT chest 02/14/2023, 3-4.5 mm  -medicate denied our request for SSTR-PET/CT (68 gallium DOTATATE; or 64Cu Dotatate; or 68 gallium  -02/17/2023:  24 hour urine 5-HIAA level normal      Plan:  Locoregional disease  Does not need any adjuvant therapy   Need surveillance:  Every 3-6 months for 2 years (12/2022-12/2024), then every 6-12 months for 10 years with history and physical; abdominal/pelvic MRI with contrast or abdominal/pelvic multiphasic CT; CT chest as clinically indicated  >>>  Repeat CBC, CMP, 24 hour urine for 5-HIAA, multiphasic contrast-enhanced CT scans of A/P, and contrast-enhanced CT scan of chest in 3 months (middle of May)    Follow-up in middle of May, with scans and labs.      Above discussed with him.  All questions answered.    Discussed labs and scans and gave him copies of relevant records.    Plan of surveillance discussed.    He understands and agrees with this plan.    Follow-up:  No follow-ups on file.

## 2023-03-07 LAB — 5OH-INDOLEACETATE SERPL-MCNC: 14 NG/ML

## 2023-05-22 ENCOUNTER — HOSPITAL ENCOUNTER (OUTPATIENT)
Dept: RADIOLOGY | Facility: HOSPITAL | Age: 58
Discharge: HOME | End: 2023-05-22
Attending: INTERNAL MEDICINE
Payer: MEDICAID

## 2023-05-22 DIAGNOSIS — C7A.8 PRIMARY MALIGNANT NEUROENDOCRINE TUMOR OF CECUM: ICD-10-CM

## 2023-05-22 DIAGNOSIS — C7A.1 HIGH GRADE NEUROENDOCRINE CARCINOMA OF LARGE INTESTINE: ICD-10-CM

## 2023-05-22 DIAGNOSIS — K76.9 LIVER LESION, RIGHT LOBE: ICD-10-CM

## 2023-05-22 PROCEDURE — 25500020 PHARM REV CODE 255: Performed by: INTERNAL MEDICINE

## 2023-05-22 PROCEDURE — 74178 CT ABD&PLV WO CNTR FLWD CNTR: CPT | Mod: TC

## 2023-05-22 PROCEDURE — 71260 CT THORAX DX C+: CPT | Mod: TC

## 2023-05-22 RX ADMIN — IOHEXOL 100 ML: 350 INJECTION, SOLUTION INTRAVENOUS at 11:05

## 2023-05-25 ENCOUNTER — OFFICE VISIT (OUTPATIENT)
Dept: HEMATOLOGY/ONCOLOGY | Facility: CLINIC | Age: 58
End: 2023-05-25
Attending: INTERNAL MEDICINE
Payer: MEDICAID

## 2023-05-25 VITALS
WEIGHT: 143.63 LBS | BODY MASS INDEX: 24.22 KG/M2 | HEART RATE: 79 BPM | DIASTOLIC BLOOD PRESSURE: 88 MMHG | SYSTOLIC BLOOD PRESSURE: 149 MMHG | RESPIRATION RATE: 18 BRPM | TEMPERATURE: 98 F | OXYGEN SATURATION: 100 %

## 2023-05-25 DIAGNOSIS — C7A.1 HIGH GRADE NEUROENDOCRINE CARCINOMA OF LARGE INTESTINE: Primary | ICD-10-CM

## 2023-05-25 DIAGNOSIS — K76.9 LIVER LESION, RIGHT LOBE: ICD-10-CM

## 2023-05-25 DIAGNOSIS — R91.8 LUNG NODULES: ICD-10-CM

## 2023-05-25 PROCEDURE — 99213 PR OFFICE/OUTPT VISIT, EST, LEVL III, 20-29 MIN: ICD-10-PCS | Mod: S$PBB,,, | Performed by: INTERNAL MEDICINE

## 2023-05-25 PROCEDURE — 1160F PR REVIEW ALL MEDS BY PRESCRIBER/CLIN PHARMACIST DOCUMENTED: ICD-10-PCS | Mod: CPTII,,, | Performed by: INTERNAL MEDICINE

## 2023-05-25 PROCEDURE — 1160F RVW MEDS BY RX/DR IN RCRD: CPT | Mod: CPTII,,, | Performed by: INTERNAL MEDICINE

## 2023-05-25 PROCEDURE — 4010F ACE/ARB THERAPY RXD/TAKEN: CPT | Mod: CPTII,,, | Performed by: INTERNAL MEDICINE

## 2023-05-25 PROCEDURE — 3079F PR MOST RECENT DIASTOLIC BLOOD PRESSURE 80-89 MM HG: ICD-10-PCS | Mod: CPTII,,, | Performed by: INTERNAL MEDICINE

## 2023-05-25 PROCEDURE — 99213 OFFICE O/P EST LOW 20 MIN: CPT | Mod: PBBFAC | Performed by: INTERNAL MEDICINE

## 2023-05-25 PROCEDURE — 3077F SYST BP >= 140 MM HG: CPT | Mod: CPTII,,, | Performed by: INTERNAL MEDICINE

## 2023-05-25 PROCEDURE — 3079F DIAST BP 80-89 MM HG: CPT | Mod: CPTII,,, | Performed by: INTERNAL MEDICINE

## 2023-05-25 PROCEDURE — 3077F PR MOST RECENT SYSTOLIC BLOOD PRESSURE >= 140 MM HG: ICD-10-PCS | Mod: CPTII,,, | Performed by: INTERNAL MEDICINE

## 2023-05-25 PROCEDURE — 99213 OFFICE O/P EST LOW 20 MIN: CPT | Mod: S$PBB,,, | Performed by: INTERNAL MEDICINE

## 2023-05-25 PROCEDURE — 3008F PR BODY MASS INDEX (BMI) DOCUMENTED: ICD-10-PCS | Mod: CPTII,,, | Performed by: INTERNAL MEDICINE

## 2023-05-25 PROCEDURE — 3008F BODY MASS INDEX DOCD: CPT | Mod: CPTII,,, | Performed by: INTERNAL MEDICINE

## 2023-05-25 PROCEDURE — 1159F MED LIST DOCD IN RCRD: CPT | Mod: CPTII,,, | Performed by: INTERNAL MEDICINE

## 2023-05-25 PROCEDURE — 1159F PR MEDICATION LIST DOCUMENTED IN MEDICAL RECORD: ICD-10-PCS | Mod: CPTII,,, | Performed by: INTERNAL MEDICINE

## 2023-05-25 PROCEDURE — 4010F PR ACE/ARB THEARPY RXD/TAKEN: ICD-10-PCS | Mod: CPTII,,, | Performed by: INTERNAL MEDICINE

## 2023-05-25 NOTE — Clinical Note
Orders for today:   For surveillance, repeat multiphasic contrast-enhanced CT scans of A/P, and contrast-enhanced CT chest in November  Follow-up in November, with scans, CBC, CMP, and 24 hour urine for 5-HIAA level.

## 2023-05-25 NOTE — PROGRESS NOTES
History:  Past Medical History:   Diagnosis Date    Essential (primary) hypertension    Social history:  Single.  Lives in [place], [place].  Has 3 children.  Not working for last 1 year; previously, used to work in construction.  Has been smoking < a pack of cigarettes daily for 10-15 years.  Used to drink 24 beers daily; drank for 7-8 years, quit 20 years ago.  No illicit drugs.    Family history:  Maternal grandmother experience breast cancer in her 40s.  Health maintenance:  Has no PCP.  Amenable to be referred to Internal Medicine, [facility], to establish with a PCP.  No past surgical history on file.   Social History     Socioeconomic History    Marital status: Single   Tobacco Use    Smoking status: Every Day     Packs/day: 0.50     Types: Cigarettes    Smokeless tobacco: Never   Substance and Sexual Activity    Alcohol use: Not Currently      No family history on file.     Reason for Follow-up:  -well-differentiated neuroendocrine tumor, G3, of right colon/cecum, S/P colectomy 12/18/2022, pT3 pN1 pG3 pR0, at least stage IIIB, MMR proficient  -7 mm lesion right hepatic lobe on Cts  -lung nodules    History of Present Illness:   No chief complaint on file.        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 Toribio Gómez, [facility], with colon cancer.    Presented to the emergency department with complaints of some right groin pain.  On workup, found to have a large malignant appearing cecal mass.  Colonoscopy confirmed a large mass.  Mass was causing symptoms in the form of pain and neuralgia.  Has a 7 mm nodule in liver which will need  further workup on repeat imaging versus biopsy.    10/18/2022:  CT chest with IV contrast (staging evaluation):  -no evidence of metastatic disease in chest   -small sliding hiatal hernia with wall thickening of distal esophagus which may    12/15/2022: CT A/P with IV contrast only (left lower quadrant abdominal pain):  -moderate to severe masslike thickening seen of cecum, can not rule out underlying neoplastic mass  -7 mm hypodense lesion right hepatic lobe, too small to fully characterize; triphasic CT or MRI could be considered to exclude underlying metastasis    12/17/2022: Colonoscopy (Dr. Jericho Salazar MD) (cecal mass on CT scan):  -cecum:  Large ulcerated mass encompassing 50% of circumference of the lumen on the opposite wall of the IC valve  -ascending colon, transverse colon, descending colon normal; sigmoid colon normal; rectum normal    12/18/2022:  Laparoscopic converted to open right colectomy:   -right lower quadrant showed large malignant-appearing mass arising through the colon and apparently growing into the anterior abdominal wall, requiring transition to open incision (open right colectomy)    Pathology:  -right:, partial colectomy; high-grade colonic neuroendocrine tumor; tumor site, right colon; 8.3 x 7.3 x 6.2 cm; no macroscopic tumor perforation; tumor extends through the muscularis propria; proximal and distal margins free (distance from closest margin 2.1 cm from the mesenteric/radial margin); no lymphovascular invasion; no perineural invasion; no tumor deposits; 1/24 mesenteric lymph nodes + for metastasis  >> pT3 pN1 pG3 pR0    No DNA mismatch repair abnormality noted (MMR proficient).  Low probability of microsatellite instability-high     Comment:  The specimen had an infection of a large bulky mass of colonic adenocarcinoma.  Possibilities include this to be simply a routine colonic adenocarcinoma or a neuroendocrine tumor.  Immunostains were performed.  This represents a high-grade  colonic neuroendocrine tumor.  Well-differentiated neuroendocrine tumor, G3 (NET, G3)    -02/02/2023: CT A/P with IV contrast pelvic comparison:  12/15/2022):  -postsurgical changes from cecal mass resection  -no evidence of metastatic disease    02/06/2023:   Presents for initial medical oncology visit.  Accompanied by his sister and daughter.  Pleasant gentleman. , in no acute discomfort.    Overall, feels well.  Recovering from surgery.  Occasional constipation and diarrhea.  No blood in stool.  No abdominal pain.  No fevers or chills.  Some generalized weakness and fatigue.  Appetite is fluctuating.  ECOG 1.  No chest pain, cough, or dyspnea.  No flushing or wheezing.  No dizzy spells.        Interval History:  [No matching plan found]   [No matching plan found]   05/25/2023:   -admitted [facility] 05/04/2023; discharged 05/06/2023; Patient presents to the ED with complaints of right lower quadrant abdominal pain radiating into his groin into his right leg. Patient reports inguinal hernia repair surgery about 8 weeks ago with Dr. Toribio Gómez. Patient reports no nausea, vomiting but does report difficulty with his bowel movements. Patient has no other complaints at this time except for above.; ultrasound showed hypoechoic areas and lesions communicating with cord; underwent scrotal exploration with excision of testicular cysts, found to have grade, rubbery testicular parenchyma which was sent for frozen section intraoperatively, benign; some Sertoli cells with few germ cells were identified along with dense inflammatory change with no fluid of the tunica vaginalis consistent with chronic scarring process; he had some complained of burning pain down right leg going into the inguinal area; # MRI showed no acute fracture, remote L1 compression fracture, multilevel spondylosis and grade 1 anterolisthesis of L5-S1 with bilateral L5 pars defects  -05/05/2023:  Scrotal exploration, excision  of testicular cysts (preop diagnosis: Testicular pain, left testicular cystic lesions)  Pathology:  Epididymal cyst, no malignancy; left lower pole testicle, no malignancy; cyst wall, simple interest testicular cyst  -05/04/2023:  Testicular ultrasound (comparison: CT A/P 05/04/2023):  Mild interval enlargement of a heterogeneous soft tissue mass like focus of the spermatic cord, measuring 2.1 cm on the left which previously measured 1.9 cm on prior examination from 3/18/2023.   Heterogeneous soft tissue masslike focus of the right spermatic cord, measuring 2.9 cm. This was not identified on prior CT evaluation. Urology consultation is advised with short-term follow-up testicular ultrasound.  Complex intratesticular cyst of the left testicle which communicate with an extratesticular anechoic fluid.  No evidence of testicular torsion.  -05/22/2023: CT A/P with and without contrast pelvic comparison:  02/14/2023):   There are 2 enhancing lesions in the liver as described above 1 of which represents a benign hemangioma, the other may represent a benign hemangioma or flash filling venous angioma.  Follow-up exam in 6 months recommended to document stability.   Soft tissue density lung nodule inferior right upper lobe, not included in the field of view on prior exams.  -05/22/2023: CT chest with contrast (comparison:  02/14/2023):  No new suspicious findings; small lung nodules are stable  Presents for a follow-up visit.  Doing well.  Good appetite.  Good energy.  Occasional, self-limiting, nonbloody, painless diarrhea.      Medications:  Current Outpatient Medications on File Prior to Visit   Medication Sig Dispense Refill    ondansetron (ZOFRAN-ODT) 4 MG TbDL Take 4 mg by mouth every 8 (eight) hours as needed.      oxyCODONE-acetaminophen (PERCOCET) 5-325 mg per tablet Take 1 tablet by mouth every 4 (four) hours as needed for Pain. 30 tablet 0    pantoprazole (PROTONIX) 40 MG tablet Take 1 tablet (40 mg total) by mouth  once daily. 30 tablet 0    promethazine (PHENERGAN) 25 MG tablet Take 25 mg by mouth every 6 (six) hours as needed.       No current facility-administered medications on file prior to visit.       Review of Systems:   All systems reviewed and found to be negative except for the symptoms detailed above    Physical Examination:   VITAL SIGNS:   Vitals:    05/25/23 1124   BP: (!) 149/88   Pulse: 79   Resp: 18   Temp: 97.5 °F (36.4 °C)       GENERAL:  In no apparent distress.    HEAD:  No signs of head trauma.  EYES:  Pupils are equal.  Extraocular motions intact.    EARS:  Hearing grossly intact.  MOUTH:  Oropharynx is normal.   NECK:  No adenopathy, no JVD.     CHEST:  Chest with clear breath sounds bilaterally.  No wheezes, rales, rhonchi.    CARDIAC:  Regular rate and rhythm.  S1 and S2, without murmurs, gallops, rubs.  VASCULAR:  No Edema.  Peripheral pulses normal and equal in all extremities.  ABDOMEN:  Soft, without detectable tenderness.  No sign of distention.  No   rebound or guarding, and no masses palpated.   Bowel Sounds normal.  MUSCULOSKELETAL:  Good range of motion of all major joints. Extremities without clubbing, cyanosis or edema.    NEUROLOGIC EXAM:  Alert and oriented x 3.  No focal sensory or strength deficits.   Speech normal.  Follows commands.  PSYCHIATRIC:  Mood normal.    No results for input(s): CBC in the last 72 hours.   No results for input(s): CMP in the last 72 hours.     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 - Primary       GI    Liver lesion, right lobe       Well-differentiated, high-grade (NET, G3) neuroendocrine carcinoma of right colon/cecum:   -7 mm hypodense lesion right hepatic lobe on CT A/P with IV contrast 12/15/2022 (not visualized on subsequent CT A/P with contrast 02/14/2023)  -no metastases on CT chest 10/18/2022 and CT A/P 12/15/2022 (except for 7  mm lesion right hepatic lobe)  -S/P colonoscopy 12/17/2022:  Large ulcerated mass encompassing 50% of circumference of the lumen on the opposite side of the IC valve  -S/P right partial colectomy 12/18/2022  -tumor 8.3 x 7.3 x 6.2 cm; pT3, etc.  -1/24 mesenteric lymph nodes + for metastasis  -pT3 pN1 M0 pG3 pR0, AJCC prognostic stage IIIB  -MMR proficient     -no metastases on staging CTs C/A/P with contrast 02/14/2023   -7 mm hypodense lesion right hepatic lobe noted on CT A/P with IV contrast 12/15/2022, not visualized on CTs A/P with contrast 02/14/2023  -multiple scattered small lung nodules on CT chest 02/14/2023, 3-4.5 mm  -medicate denied our request for SSTR-PET/CT (68 gallium DOTATATE; or 64Cu Dotatate; or 68 gallium  -02/17/2023:  24 hour urine 5-HIAA level normal  -05/04/2023: [facility]:  Right lower quadrant abdominal pain radiating into groin; underwent scrotal exploration, excision of testicular cysts on 05/05/2023; no malignancy; no testicular torsion or ultrasound  -no recurrence or disease progression on surveillance CTs C/A/P 05/22/2023      Plan:  Locoregional disease  Does not need any adjuvant therapy     Need surveillance:  Every 3-6 months for 2 years (12/2022-12/2024), then every 6-12 months for 10 years with history and physical; abdominal/pelvic MRI with contrast or abdominal/pelvic multiphasic CT; CT chest as clinically indicated  -no recurrence or disease progression on surveillance CTs C/A/P 05/22/2023  >>>  For surveillance, repeat multiphasic contrast-enhanced CT scans of A/P, and contrast-enhanced chest CT in 6 months (November)  Repeat CBC, CMP, 24 hour urine for 5-HIAA in 6 months (November)    Follow-up in 6 months (November), with scans and labs.      Above discussed with him.  All questions answered.    Discussed labs and scans and gave him copies of relevant records.    Plan of surveillance discussed.    He understands and agrees with this  plan.    Follow-up:  No follow-ups on file.

## 2023-06-02 DIAGNOSIS — B17.10 ACUTE HEPATITIS C VIRUS INFECTION WITHOUT HEPATIC COMA: Primary | ICD-10-CM

## 2023-06-09 DIAGNOSIS — B17.10 ACUTE HEPATITIS C VIRUS INFECTION WITHOUT HEPATIC COMA: Primary | ICD-10-CM

## 2023-07-06 ENCOUNTER — LAB VISIT (OUTPATIENT)
Dept: LAB | Facility: HOSPITAL | Age: 58
End: 2023-07-06
Attending: NURSE PRACTITIONER
Payer: MEDICAID

## 2023-07-06 DIAGNOSIS — K76.9 LIVER LESION, RIGHT LOBE: ICD-10-CM

## 2023-07-06 DIAGNOSIS — C7A.1 HIGH GRADE NEUROENDOCRINE CARCINOMA OF LARGE INTESTINE: ICD-10-CM

## 2023-07-06 DIAGNOSIS — B17.10 ACUTE HEPATITIS C VIRUS INFECTION WITHOUT HEPATIC COMA: ICD-10-CM

## 2023-07-06 DIAGNOSIS — C7A.8 PRIMARY MALIGNANT NEUROENDOCRINE TUMOR OF CECUM: ICD-10-CM

## 2023-07-06 LAB
ALBUMIN SERPL-MCNC: 4 G/DL (ref 3.5–5)
ALBUMIN/GLOB SERPL: 1.2 RATIO (ref 1.1–2)
ALP SERPL-CCNC: 77 UNIT/L (ref 40–150)
ALT SERPL-CCNC: 35 UNIT/L (ref 0–55)
AST SERPL-CCNC: 24 UNIT/L (ref 5–34)
BASOPHILS # BLD AUTO: 0.05 X10(3)/MCL
BASOPHILS NFR BLD AUTO: 0.6 %
BILIRUBIN DIRECT+TOT PNL SERPL-MCNC: 0.3 MG/DL
BUN SERPL-MCNC: 21 MG/DL (ref 8.4–25.7)
CALCIUM SERPL-MCNC: 9.4 MG/DL (ref 8.4–10.2)
CHLORIDE SERPL-SCNC: 111 MMOL/L (ref 98–107)
CO2 SERPL-SCNC: 19 MMOL/L (ref 22–29)
CREAT SERPL-MCNC: 1.43 MG/DL (ref 0.73–1.18)
EOSINOPHIL # BLD AUTO: 0.13 X10(3)/MCL (ref 0–0.9)
EOSINOPHIL NFR BLD AUTO: 1.6 %
ERYTHROCYTE [DISTWIDTH] IN BLOOD BY AUTOMATED COUNT: 15.3 % (ref 11.5–17)
FERRITIN SERPL-MCNC: 29.53 NG/ML (ref 21.81–274.66)
GFR SERPLBLD CREATININE-BSD FMLA CKD-EPI: 57 MLS/MIN/1.73/M2
GLOBULIN SER-MCNC: 3.3 GM/DL (ref 2.4–3.5)
GLUCOSE SERPL-MCNC: 151 MG/DL (ref 74–100)
HAV AB SER QL IA: NONREACTIVE
HBV CORE AB SERPL QL IA: NONREACTIVE
HBV SURFACE AB SER-ACNC: 0.38 MIU/ML
HBV SURFACE AB SERPL IA-ACNC: NONREACTIVE M[IU]/ML
HBV SURFACE AG SERPL QL IA: NONREACTIVE
HCT VFR BLD AUTO: 41.1 % (ref 42–52)
HGB BLD-MCNC: 13.2 G/DL (ref 14–18)
HIV 1+2 AB+HIV1 P24 AG SERPL QL IA: NONREACTIVE
IMM GRANULOCYTES # BLD AUTO: 0.02 X10(3)/MCL (ref 0–0.04)
IMM GRANULOCYTES NFR BLD AUTO: 0.3 %
LYMPHOCYTES # BLD AUTO: 1.7 X10(3)/MCL (ref 0.6–4.6)
LYMPHOCYTES NFR BLD AUTO: 21.4 %
MCH RBC QN AUTO: 28.6 PG (ref 27–31)
MCHC RBC AUTO-ENTMCNC: 32.1 G/DL (ref 33–36)
MCV RBC AUTO: 89.2 FL (ref 80–94)
MONOCYTES # BLD AUTO: 0.48 X10(3)/MCL (ref 0.1–1.3)
MONOCYTES NFR BLD AUTO: 6 %
NEUTROPHILS # BLD AUTO: 5.58 X10(3)/MCL (ref 2.1–9.2)
NEUTROPHILS NFR BLD AUTO: 70.1 %
NRBC BLD AUTO-RTO: 0 %
PLATELET # BLD AUTO: 231 X10(3)/MCL (ref 130–400)
PMV BLD AUTO: 9.9 FL (ref 7.4–10.4)
POTASSIUM SERPL-SCNC: 3.7 MMOL/L (ref 3.5–5.1)
PROT SERPL-MCNC: 7.3 GM/DL (ref 6.4–8.3)
RBC # BLD AUTO: 4.61 X10(6)/MCL (ref 4.7–6.1)
SODIUM SERPL-SCNC: 140 MMOL/L (ref 136–145)
T PALLIDUM AB SER QL: NONREACTIVE
WBC # SPEC AUTO: 7.96 X10(3)/MCL (ref 4.5–11.5)

## 2023-07-06 PROCEDURE — 81596 NFCT DS CHRNC HCV 6 ASSAYS: CPT

## 2023-07-06 PROCEDURE — 85610 PROTHROMBIN TIME: CPT

## 2023-07-06 PROCEDURE — 86780 TREPONEMA PALLIDUM: CPT

## 2023-07-06 PROCEDURE — 86039 ANTINUCLEAR ANTIBODIES (ANA): CPT

## 2023-07-06 PROCEDURE — 80053 COMPREHEN METABOLIC PANEL: CPT

## 2023-07-06 PROCEDURE — 87522 HEPATITIS C REVRS TRNSCRPJ: CPT

## 2023-07-06 PROCEDURE — 86706 HEP B SURFACE ANTIBODY: CPT

## 2023-07-06 PROCEDURE — 87340 HEPATITIS B SURFACE AG IA: CPT

## 2023-07-06 PROCEDURE — 36415 COLL VENOUS BLD VENIPUNCTURE: CPT

## 2023-07-06 PROCEDURE — 82728 ASSAY OF FERRITIN: CPT

## 2023-07-06 PROCEDURE — 86708 HEPATITIS A ANTIBODY: CPT

## 2023-07-06 PROCEDURE — 86038 ANTINUCLEAR ANTIBODIES: CPT

## 2023-07-06 PROCEDURE — 86036 ANCA SCREEN EACH ANTIBODY: CPT

## 2023-07-06 PROCEDURE — 86704 HEP B CORE ANTIBODY TOTAL: CPT

## 2023-07-06 PROCEDURE — 85025 COMPLETE CBC W/AUTO DIFF WBC: CPT

## 2023-07-06 PROCEDURE — 87902 NFCT AGT GNTYP ALYS HEP C: CPT

## 2023-07-06 PROCEDURE — 87389 HIV-1 AG W/HIV-1&-2 AB AG IA: CPT

## 2023-07-08 LAB
AR ANA INTERPRETIVE COMMENT: ABNORMAL
AR ANA PATTERN: ABNORMAL
AR ANA TITER: ABNORMAL
AR ANTINUCLEAR ANTIBODY (ANA), HEP-2, IGG: DETECTED
AR CYTOPLASM PATTERN: ABNORMAL
AR CYTOPLASMIC TITER: ABNORMAL
HCV RNA SERPL NAA+PROBE-ACNC: ABNORMAL IU/ML

## 2023-07-10 LAB
A2 MACROGLOB SERPL-MCNC: 221 MG/DL (ref 100–280)
ALT SERPL W P-5'-P-CCNC: 42 U/L (ref 7–55)
ANNOTATION COMMENT IMP: NORMAL
APO A-I SERPL-MCNC: 130 MG/DL
BILIRUB SERPL-MCNC: 0.2 MG/DL
FIBROSIS STAGE SERPL QL: NORMAL
GGT SERPL-CCNC: 18 U/L (ref 8–61)
HAPTOGLOB SERPL NEPH-MCNC: 149 MG/DL (ref 30–200)
HCV GENTYP SERPL NAA+PROBE: 2
LIVER FIBR SCORE SERPL CALC.FIBROSURE: 0.16
LIVER FIBROSIS INTERPRETATION SER-IMP: NORMAL
NECROINFLAMMATORY ACT GRADE SERPL QL: NORMAL
NECROINFLAMMATORY ACT SCORE SERPL: 0.2
NECROINFLAMMATORY ACTIV INTERP SER-IMP: NORMAL
SERIAL #: NORMAL

## 2023-07-18 ENCOUNTER — PROCEDURE VISIT (OUTPATIENT)
Dept: INFECTIOUS DISEASES | Facility: CLINIC | Age: 58
End: 2023-07-18
Payer: MEDICAID

## 2023-07-18 ENCOUNTER — OFFICE VISIT (OUTPATIENT)
Dept: INFECTIOUS DISEASES | Facility: CLINIC | Age: 58
End: 2023-07-18
Payer: MEDICAID

## 2023-07-18 VITALS
TEMPERATURE: 98 F | HEIGHT: 69 IN | WEIGHT: 142 LBS | RESPIRATION RATE: 18 BRPM | DIASTOLIC BLOOD PRESSURE: 92 MMHG | SYSTOLIC BLOOD PRESSURE: 142 MMHG | HEART RATE: 82 BPM | BODY MASS INDEX: 21.03 KG/M2

## 2023-07-18 DIAGNOSIS — B18.2 CHRONIC HEPATITIS C WITHOUT HEPATIC COMA: Primary | ICD-10-CM

## 2023-07-18 DIAGNOSIS — B17.10 ACUTE HEPATITIS C VIRUS INFECTION WITHOUT HEPATIC COMA: ICD-10-CM

## 2023-07-18 DIAGNOSIS — Z23 NEED FOR VACCINATION: ICD-10-CM

## 2023-07-18 PROCEDURE — 3008F PR BODY MASS INDEX (BMI) DOCUMENTED: ICD-10-PCS | Mod: CPTII,,, | Performed by: NURSE PRACTITIONER

## 2023-07-18 PROCEDURE — 3077F SYST BP >= 140 MM HG: CPT | Mod: CPTII,,, | Performed by: NURSE PRACTITIONER

## 2023-07-18 PROCEDURE — 90471 IMMUNIZATION ADMIN: CPT | Mod: PBBFAC

## 2023-07-18 PROCEDURE — 1160F RVW MEDS BY RX/DR IN RCRD: CPT | Mod: CPTII,,, | Performed by: NURSE PRACTITIONER

## 2023-07-18 PROCEDURE — 3077F PR MOST RECENT SYSTOLIC BLOOD PRESSURE >= 140 MM HG: ICD-10-PCS | Mod: CPTII,,, | Performed by: NURSE PRACTITIONER

## 2023-07-18 PROCEDURE — 90636 HEP A/HEP B VACC ADULT IM: CPT | Mod: PBBFAC

## 2023-07-18 PROCEDURE — 1159F PR MEDICATION LIST DOCUMENTED IN MEDICAL RECORD: ICD-10-PCS | Mod: CPTII,,, | Performed by: NURSE PRACTITIONER

## 2023-07-18 PROCEDURE — 3080F PR MOST RECENT DIASTOLIC BLOOD PRESSURE >= 90 MM HG: ICD-10-PCS | Mod: CPTII,,, | Performed by: NURSE PRACTITIONER

## 2023-07-18 PROCEDURE — 4010F ACE/ARB THERAPY RXD/TAKEN: CPT | Mod: CPTII,,, | Performed by: NURSE PRACTITIONER

## 2023-07-18 PROCEDURE — 3008F BODY MASS INDEX DOCD: CPT | Mod: CPTII,,, | Performed by: NURSE PRACTITIONER

## 2023-07-18 PROCEDURE — 4010F PR ACE/ARB THEARPY RXD/TAKEN: ICD-10-PCS | Mod: CPTII,,, | Performed by: NURSE PRACTITIONER

## 2023-07-18 PROCEDURE — 99214 OFFICE O/P EST MOD 30 MIN: CPT | Mod: PBBFAC | Performed by: NURSE PRACTITIONER

## 2023-07-18 PROCEDURE — 99214 OFFICE O/P EST MOD 30 MIN: CPT | Mod: S$PBB,,, | Performed by: NURSE PRACTITIONER

## 2023-07-18 PROCEDURE — 3080F DIAST BP >= 90 MM HG: CPT | Mod: CPTII,,, | Performed by: NURSE PRACTITIONER

## 2023-07-18 PROCEDURE — 91200 LIVER ELASTOGRAPHY: CPT | Mod: PBBFAC | Performed by: STUDENT IN AN ORGANIZED HEALTH CARE EDUCATION/TRAINING PROGRAM

## 2023-07-18 PROCEDURE — 1160F PR REVIEW ALL MEDS BY PRESCRIBER/CLIN PHARMACIST DOCUMENTED: ICD-10-PCS | Mod: CPTII,,, | Performed by: NURSE PRACTITIONER

## 2023-07-18 PROCEDURE — 1159F MED LIST DOCD IN RCRD: CPT | Mod: CPTII,,, | Performed by: NURSE PRACTITIONER

## 2023-07-18 PROCEDURE — 99214 PR OFFICE/OUTPT VISIT, EST, LEVL IV, 30-39 MIN: ICD-10-PCS | Mod: S$PBB,,, | Performed by: NURSE PRACTITIONER

## 2023-07-18 RX ORDER — LISINOPRIL 20 MG/1
20 TABLET ORAL DAILY
COMMUNITY
End: 2024-01-03

## 2023-07-18 RX ORDER — VELPATASVIR AND SOFOSBUVIR 100; 400 MG/1; MG/1
1 TABLET, FILM COATED ORAL DAILY
Qty: 84 TABLET | Refills: 0 | Status: SHIPPED | OUTPATIENT
Start: 2023-07-18 | End: 2023-11-13

## 2023-07-18 NOTE — PROGRESS NOTES
Subjective     Patient ID: Sammy Palmer is a 58 y.o. male.    Chief Complaint: New Hep C (C/O fatigue, pain radiates to lower legs, HA's daily x 5 days, questions how received virus)    7/18/23  Mr. Christianson is a 57 yo WM referred to [facility] by Dr. Priest for evaluation & treatment of Hepatitis C.  He is accompanied today by his sister Chitra Palmer.  He was diagnosed 6/2023 by Dr. Priest & is treatment naive.  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  Denies any blood transfusions, IVDU, known HCV + sexual partners.  He is in a long term heterosexual monogamous relationship, partner to seek testing as well.  Last reported drug use about 1 week ago. He is not immune to Hep A or B, amenable to Twinrix vaccination series as recommended. Will start today. FibroScan completed today, results pending.  He is eager to start treatment asap & agrees to adhere to recommended treatment protocol.  He denies jaundice, icterus, nausea, vomiting, dark urine, or mirtha colored stools.  Endorses extreme fatigue. History of colon cancer, followed by Dr. Porter for same. CT of abd 6/14/23, reading reviewed. All questions answered & concerns addressed.      Review of Systems   Constitutional:  Positive for fatigue.   HENT: Negative.     Respiratory: Negative.     Cardiovascular: Negative.    Gastrointestinal: Negative.    Genitourinary: Negative.    Integumentary:  Negative.   Neurological: Negative.    Hematological: Negative.    Psychiatric/Behavioral: Negative.          Objective     Physical Exam  Vitals reviewed.   Constitutional:       General: He is not in acute distress.     Appearance: Normal appearance. He is ill-appearing. He is not toxic-appearing.   Eyes:      General: No scleral icterus.  Cardiovascular:      Rate and Rhythm: Normal rate and regular rhythm.      Heart sounds: Normal heart sounds.   Pulmonary:      Effort: Pulmonary effort is normal. No  respiratory distress.      Breath sounds: Normal breath sounds.   Abdominal:      General: Bowel sounds are normal. There is no distension.      Palpations: Abdomen is soft. There is no mass.      Tenderness: There is no abdominal tenderness.   Musculoskeletal:         General: Normal range of motion.   Skin:     General: Skin is warm and dry.   Neurological:      Mental Status: He is alert and oriented to person, place, and time.          Assessment and Plan     1. Chronic hepatitis C without hepatic coma  -     sofosbuvir-velpatasvir (EPCLUSA) 400-100 mg Tab; Take 1 tablet by mouth once daily.  Dispense: 84 tablet; Refill: 0  Diagnosed 6/2023  Treatment naive.  GT 2, baseline VL [address].  Fibrosure: 7/6/23 A0-1, F0.  FibroScan 7/18/23.  CT abd 6/14/23: hemangiomas to liver noted.  Blood precautions: do not share a razor, needle, toothbrush, clippers with anyone.  Avoid all illicits & alcohol.   Increase water intake.  Epclusa 1 po daily x 12 weeks.   Refer to HCV  to initiate PA & treatment protocol.   RTC approximately 6 weeks with Frieda.      2. Need for vaccination  -     Hepatitis A / Hepatitis B Combined Vaccine (IM); Future  Twinrix #1 today, #2 next visit, #3 due 1/18/24.

## 2023-07-20 NOTE — PROCEDURES
Fibroscan Procedure     Name: Sammy Palmer  Date of Procedure :   Interpreting Physician: Duc Porras MD  Diagnosis: HCV    Probe: M    Fibroscan readin.1 kPa    Fibrosis: F 0-1     CAP readin dB/m    Steatosis: S0      Miscellaneous:

## 2023-08-04 ENCOUNTER — TELEPHONE (OUTPATIENT)
Dept: INFECTIOUS DISEASES | Facility: CLINIC | Age: 58
End: 2023-08-04
Payer: MEDICAID

## 2023-08-04 NOTE — TELEPHONE ENCOUNTER
Generic epclusa delivered to [facility] from [facility] pharmacy on 8/3/2023    Attempted to contact pt for hcv teaching, left message with spouse for return call

## 2023-08-09 ENCOUNTER — TELEPHONE (OUTPATIENT)
Dept: INFECTIOUS DISEASES | Facility: CLINIC | Age: 58
End: 2023-08-09
Payer: MEDICAID

## 2023-08-09 NOTE — TELEPHONE ENCOUNTER
Called sister Alma back, left her know a message was left last Friday to call our clinic.  She did not get the message.  Informed her CM2 out and will call them to set up teaching.    Per sister we should only call her, pt does not answer his phone.

## 2023-08-09 NOTE — TELEPHONE ENCOUNTER
----- Message from Clotilde Cobian sent at 8/9/2023  7:30 AM CDT -----  Spoke with patient's sister Alma,  calling to check the status of medication for Hep C.    Alma stated, medication should have been delivered to [facility] and it's been a couple of weeks now    L.O.V    7/18/2023 w/ E Phuong     Please Advise     [phone]    Thanks

## 2023-08-10 ENCOUNTER — TELEPHONE (OUTPATIENT)
Dept: INFECTIOUS DISEASES | Facility: CLINIC | Age: 58
End: 2023-08-10
Payer: MEDICAID

## 2023-08-10 NOTE — TELEPHONE ENCOUNTER
----- Message from Clotilde Cobian sent at 8/10/2023 11:29 AM CDT -----  Patient sister Alma , returning a call to nurse     [phone]    Thanks

## 2023-08-14 ENCOUNTER — CLINICAL SUPPORT (OUTPATIENT)
Dept: INFECTIOUS DISEASES | Facility: CLINIC | Age: 58
End: 2023-08-14
Payer: MEDICAID

## 2023-08-14 ENCOUNTER — TELEPHONE (OUTPATIENT)
Dept: ENDOCRINOLOGY | Facility: CLINIC | Age: 58
End: 2023-08-14
Payer: MEDICAID

## 2023-08-14 DIAGNOSIS — B17.10 ACUTE HEPATITIS C VIRUS INFECTION WITHOUT HEPATIC COMA: Primary | ICD-10-CM

## 2023-08-14 NOTE — TELEPHONE ENCOUNTER
Left voice message for patient and spoke with his sister Alma, informed Ms. Malave states acetaminophen would be a better option recommends against ibuprofen.  Also, increased water intake (at least 64 oz/day) can help with the headaches and his renal function.

## 2023-08-14 NOTE — TELEPHONE ENCOUNTER
I do not recommend Ibuprofen in his case, as his creatinine was elevated on baseline labs.  His estimated fibrosis stage is 0-1, so acetaminophen would be a better option in his case. Also, increased water intake (at least 64 oz/day) can help with the headaches and his renal function.  Please notify pt. Thank you.

## 2023-08-14 NOTE — TELEPHONE ENCOUNTER
Patient here for [facility] education, has questions as to what he can take for headaches.  He states he is currently taking Ibuprofen.

## 2023-08-14 NOTE — PROGRESS NOTES
Patient here for HCV treatment education, the following instructions were given to patient.   -- Start generic Epclusa 400/100mg  1 tablet by mouth daily.   -- Drink plenty of fluids.   -- Most common side effects are headache and fatigue, which should improve once taking medication.   -- Avoid alcohol.   -- Notify clinic before starting any new prescriptions or over the counter medications.   -- If a dose is missed do not double up, continue the next scheduled dose or contact the clinic.   -- Contact [facility] pharmacy @ [phone] 1 week prior to needing refill.   -- Lab orders and appointment dates given to patient.   -- Rx for epclusa given to patient, patient took initial dose in clinic.   -- Copy of the above information give to patient.    Patient verbalized understanding of all the above instructions.

## 2023-08-16 LAB
INR PPP: 1.1
PROTHROMBIN TIME: 14.3 SECONDS (ref 11.4–14)

## 2023-08-21 ENCOUNTER — TELEPHONE (OUTPATIENT)
Dept: INFECTIOUS DISEASES | Facility: CLINIC | Age: 58
End: 2023-08-21
Payer: MEDICAID

## 2023-08-21 NOTE — TELEPHONE ENCOUNTER
----- Message from Clotilde Cobian sent at 8/21/2023 10:18 AM CDT -----  Patient of Deanna Baca    Spoke w/patient's sister Alma Palmer, patient has been recently placed on heart medication ( Entresto).    Patient would like to know if it's ok to take Hep C medication along with Entresto.    Please Advise     [phone] ( Alma Palmer )

## 2023-08-21 NOTE — TELEPHONE ENCOUNTER
----- Message from Clotilde Cobian sent at 8/21/2023 10:18 AM CDT -----  Patient of Deanna Baca    Spoke w/patient's sister Alma Palmer, patient has been recently placed on heart medication ( Entresto).    Patient would like to know if it's ok to take Hep C medication along with Entresto.    Please Advise     [phone] ( Alma Palmer )

## 2023-08-22 NOTE — TELEPHONE ENCOUNTER
Phoned patient's sister, Alma. Informed of no interaction between Enestro and Epclusa, okay to proceed with medication. Voiced understanding and appreciates call.

## 2023-08-22 NOTE — TELEPHONE ENCOUNTER
Drug-drug interaction check completed.  No significant interactions with Entresto & Epclusa.  OK to proceed as prescribed. Thank you.

## 2023-09-27 ENCOUNTER — TELEPHONE (OUTPATIENT)
Dept: INFECTIOUS DISEASES | Facility: CLINIC | Age: 58
End: 2023-09-27
Payer: MEDICAID

## 2023-09-27 DIAGNOSIS — B18.2 CHRONIC HEPATITIS C WITHOUT HEPATIC COMA: Primary | ICD-10-CM

## 2023-09-27 NOTE — TELEPHONE ENCOUNTER
----- Message from NGUYỄN Hendrix sent at 9/27/2023  3:39 PM CDT -----  Regarding: RE: Side effects  I will place a referral.    ----- Message -----  From: Itzel Sterling LPN  Sent: 9/27/2023   3:28 PM CDT  To: SRI Phillips  Subject: FW: Side effects                                 I returned call to Alma and advised her that this is not a typical SE of Epculsa. She said this is a sudden change in his behavior, going from verbal outbursts to crying to being withdrawn. She is asking for a referral to a PCP here at [facility], as he does not have one. I advised her to watch him for SI/HI's, and if this becomes evident to call 911 or bring him to the nearest ED. She verbalized understanding.  ----- Message -----  From: Mirlande Carroll FNP  Sent: 9/27/2023   3:10 PM CDT  To: Itzel Sterling LPN  Subject: RE: Side effects                                 Violence and angry outbursts are not a typical side effect of Epclusa. I reviewed Epclusa side effects and do not see it listed.      NGUYỄN Gorman-BC   ----- Message -----  From: Itzel Sterling LPN  Sent: 9/27/2023   2:21 PM CDT  To: SRI Phillips  Subject: FW: Side effects                                   ----- Message -----  From: Natalie Paul  Sent: 9/27/2023   1:30 PM CDT  To: #  Subject: Side effects                                     NIA PT      Alma, pts sister called stating that Sammy is getting violent and cursing people out. She wants to know if it is a side effect of the medication.   Please call back @ [phone]

## 2023-09-29 ENCOUNTER — TELEPHONE (OUTPATIENT)
Dept: INFECTIOUS DISEASES | Facility: CLINIC | Age: 58
End: 2023-09-29
Payer: MEDICAID
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
## 2023-11-10 ENCOUNTER — LAB VISIT (OUTPATIENT)
Dept: LAB | Facility: HOSPITAL | Age: 58
End: 2023-11-10
Attending: NURSE PRACTITIONER
Payer: MEDICAID

## 2023-11-10 DIAGNOSIS — B17.10 ACUTE HEPATITIS C VIRUS INFECTION WITHOUT HEPATIC COMA: ICD-10-CM

## 2023-11-10 LAB
ALBUMIN SERPL-MCNC: 4 G/DL (ref 3.5–5)
ALBUMIN/GLOB SERPL: 1.5 RATIO (ref 1.1–2)
ALP SERPL-CCNC: 71 UNIT/L (ref 40–150)
ALT SERPL-CCNC: 15 UNIT/L (ref 0–55)
AST SERPL-CCNC: 22 UNIT/L (ref 5–34)
BASOPHILS # BLD AUTO: 0.05 X10(3)/MCL
BASOPHILS NFR BLD AUTO: 0.7 %
BILIRUB SERPL-MCNC: 0.4 MG/DL
BUN SERPL-MCNC: 18.9 MG/DL (ref 8.4–25.7)
CALCIUM SERPL-MCNC: 9.3 MG/DL (ref 8.4–10.2)
CHLORIDE SERPL-SCNC: 108 MMOL/L (ref 98–107)
CO2 SERPL-SCNC: 28 MMOL/L (ref 22–29)
CREAT SERPL-MCNC: 1.53 MG/DL (ref 0.73–1.18)
EOSINOPHIL # BLD AUTO: 0.17 X10(3)/MCL (ref 0–0.9)
EOSINOPHIL NFR BLD AUTO: 2.4 %
ERYTHROCYTE [DISTWIDTH] IN BLOOD BY AUTOMATED COUNT: 12.6 % (ref 11.5–17)
GFR SERPLBLD CREATININE-BSD FMLA CKD-EPI: 52 MLS/MIN/1.73/M2
GLOBULIN SER-MCNC: 2.7 GM/DL (ref 2.4–3.5)
GLUCOSE SERPL-MCNC: 133 MG/DL (ref 74–100)
HCT VFR BLD AUTO: 41.4 % (ref 42–52)
HGB BLD-MCNC: 14 G/DL (ref 14–18)
IMM GRANULOCYTES # BLD AUTO: 0.02 X10(3)/MCL (ref 0–0.04)
IMM GRANULOCYTES NFR BLD AUTO: 0.3 %
LYMPHOCYTES # BLD AUTO: 1.86 X10(3)/MCL (ref 0.6–4.6)
LYMPHOCYTES NFR BLD AUTO: 26.8 %
MCH RBC QN AUTO: 31.4 PG (ref 27–31)
MCHC RBC AUTO-ENTMCNC: 33.8 G/DL (ref 33–36)
MCV RBC AUTO: 92.8 FL (ref 80–94)
MONOCYTES # BLD AUTO: 0.43 X10(3)/MCL (ref 0.1–1.3)
MONOCYTES NFR BLD AUTO: 6.2 %
NEUTROPHILS # BLD AUTO: 4.41 X10(3)/MCL (ref 2.1–9.2)
NEUTROPHILS NFR BLD AUTO: 63.6 %
NRBC BLD AUTO-RTO: 0 %
PLATELET # BLD AUTO: 233 X10(3)/MCL (ref 130–400)
PMV BLD AUTO: 9.6 FL (ref 7.4–10.4)
POTASSIUM SERPL-SCNC: 4.4 MMOL/L (ref 3.5–5.1)
PROT SERPL-MCNC: 6.7 GM/DL (ref 6.4–8.3)
RBC # BLD AUTO: 4.46 X10(6)/MCL (ref 4.7–6.1)
SODIUM SERPL-SCNC: 140 MMOL/L (ref 136–145)
WBC # SPEC AUTO: 6.94 X10(3)/MCL (ref 4.5–11.5)

## 2023-11-10 PROCEDURE — 87522 HEPATITIS C REVRS TRNSCRPJ: CPT

## 2023-11-10 PROCEDURE — 80053 COMPREHEN METABOLIC PANEL: CPT

## 2023-11-10 PROCEDURE — 36415 COLL VENOUS BLD VENIPUNCTURE: CPT

## 2023-11-10 PROCEDURE — 85025 COMPLETE CBC W/AUTO DIFF WBC: CPT

## 2023-11-13 ENCOUNTER — OFFICE VISIT (OUTPATIENT)
Dept: INFECTIOUS DISEASES | Facility: CLINIC | Age: 58
End: 2023-11-13
Payer: MEDICAID

## 2023-11-13 VITALS
HEIGHT: 69 IN | RESPIRATION RATE: 20 BRPM | DIASTOLIC BLOOD PRESSURE: 90 MMHG | WEIGHT: 143.19 LBS | TEMPERATURE: 98 F | SYSTOLIC BLOOD PRESSURE: 158 MMHG | BODY MASS INDEX: 21.21 KG/M2 | HEART RATE: 86 BPM

## 2023-11-13 DIAGNOSIS — Z23 NEED FOR VACCINATION: ICD-10-CM

## 2023-11-13 DIAGNOSIS — B18.2 CHRONIC HEPATITIS C WITHOUT HEPATIC COMA: Primary | ICD-10-CM

## 2023-11-13 LAB — HCV RNA SERPL NAA+PROBE-ACNC: NORMAL IU/ML

## 2023-11-13 PROCEDURE — 99214 PR OFFICE/OUTPT VISIT, EST, LEVL IV, 30-39 MIN: ICD-10-PCS | Mod: S$PBB,,, | Performed by: NURSE PRACTITIONER

## 2023-11-13 PROCEDURE — 90471 IMMUNIZATION ADMIN: CPT | Mod: PBBFAC

## 2023-11-13 PROCEDURE — 99214 OFFICE O/P EST MOD 30 MIN: CPT | Mod: PBBFAC | Performed by: NURSE PRACTITIONER

## 2023-11-13 PROCEDURE — 3080F PR MOST RECENT DIASTOLIC BLOOD PRESSURE >= 90 MM HG: ICD-10-PCS | Mod: CPTII,,, | Performed by: NURSE PRACTITIONER

## 2023-11-13 PROCEDURE — 3077F SYST BP >= 140 MM HG: CPT | Mod: CPTII,,, | Performed by: NURSE PRACTITIONER

## 2023-11-13 PROCEDURE — 4010F ACE/ARB THERAPY RXD/TAKEN: CPT | Mod: CPTII,,, | Performed by: NURSE PRACTITIONER

## 2023-11-13 PROCEDURE — 99214 OFFICE O/P EST MOD 30 MIN: CPT | Mod: S$PBB,,, | Performed by: NURSE PRACTITIONER

## 2023-11-13 PROCEDURE — 3077F PR MOST RECENT SYSTOLIC BLOOD PRESSURE >= 140 MM HG: ICD-10-PCS | Mod: CPTII,,, | Performed by: NURSE PRACTITIONER

## 2023-11-13 PROCEDURE — 1159F MED LIST DOCD IN RCRD: CPT | Mod: CPTII,,, | Performed by: NURSE PRACTITIONER

## 2023-11-13 PROCEDURE — 3008F BODY MASS INDEX DOCD: CPT | Mod: CPTII,,, | Performed by: NURSE PRACTITIONER

## 2023-11-13 PROCEDURE — 1159F PR MEDICATION LIST DOCUMENTED IN MEDICAL RECORD: ICD-10-PCS | Mod: CPTII,,, | Performed by: NURSE PRACTITIONER

## 2023-11-13 PROCEDURE — 90636 HEP A/HEP B VACC ADULT IM: CPT | Mod: PBBFAC

## 2023-11-13 PROCEDURE — 3008F PR BODY MASS INDEX (BMI) DOCUMENTED: ICD-10-PCS | Mod: CPTII,,, | Performed by: NURSE PRACTITIONER

## 2023-11-13 PROCEDURE — 4010F PR ACE/ARB THEARPY RXD/TAKEN: ICD-10-PCS | Mod: CPTII,,, | Performed by: NURSE PRACTITIONER

## 2023-11-13 PROCEDURE — 90686 IIV4 VACC NO PRSV 0.5 ML IM: CPT | Mod: PBBFAC

## 2023-11-13 PROCEDURE — 3080F DIAST BP >= 90 MM HG: CPT | Mod: CPTII,,, | Performed by: NURSE PRACTITIONER

## 2023-11-13 PROCEDURE — 90472 IMMUNIZATION ADMIN EACH ADD: CPT | Mod: PBBFAC

## 2023-11-13 RX ORDER — BUDESONIDE AND FORMOTEROL FUMARATE DIHYDRATE 160; 4.5 UG/1; UG/1
AEROSOL RESPIRATORY (INHALATION)
COMMUNITY
Start: 2023-06-08

## 2023-11-13 RX ORDER — HYOSCYAMINE SULFATE 0.12 MG/1
0.12 TABLET SUBLINGUAL EVERY 4 HOURS PRN
COMMUNITY
Start: 2023-06-14

## 2023-11-13 RX ORDER — FUROSEMIDE 20 MG/1
40 TABLET ORAL
COMMUNITY
Start: 2023-03-21 | End: 2024-01-03 | Stop reason: SDUPTHER

## 2023-11-13 RX ADMIN — INFLUENZA VIRUS VACCINE 0.5 ML: 15; 15; 15; 15 SUSPENSION INTRAMUSCULAR at 01:11

## 2023-11-13 RX ADMIN — HEPATITIS A AND HEPATITIS B (RECOMBINANT) VACCINE 720 UNITS: 720; 20 INJECTION, SUSPENSION INTRAMUSCULAR at 01:11

## 2023-11-13 NOTE — PROGRESS NOTES
Influenza Vaccination given IM left deltoid and Twinrix vaccination given IM left deltoid using aseptic tech. Patient tolerated well. To contact clinic prn.

## 2023-11-13 NOTE — PROGRESS NOTES
Patient ID: Sammy Palmer 58 y.o.     Chief Complaint:   Chief Complaint   Patient presents with    Followup Hep C     States completed all medication        HPI:    11/13/23  Mr. Christianson is a 59 yo WM here today for HCV f/u visit.  He completed 12 weeks of Epclusa on 11/6/23 as prescribed.  Tolerated well without any noted side effects. [place] [address] 7/2023, HCV RNA not detected 11/10/23.  He did miss 2nd dose of Twinrix vaccination series, will receive catch-up dose today. Amenable to flu vaccine as well.  He has cut back on cigarettes from 2 ppd to 12 cig/day. Encouraged cessation.  Continues to follow with oncology. He has changed out toothbrush & razors since starting treatment. He has no identifiable risk factors for HCV re-exposure at this juncture. All questions answered & concerns addressed.    7/18/23  Mr. Christianson is a 59 yo WM referred to [facility] by Dr. Priest for evaluation & treatment of Hepatitis C.  He is accompanied today by his sister Chitra Palmer.  He was diagnosed 6/2023 by Dr. Priest & is treatment naive.  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  Denies any blood transfusions, IVDU, known HCV + sexual partners.  He is in a long term heterosexual monogamous relationship, partner to seek testing as well.  Last reported drug use about 1 week ago. He is not immune to Hep A or B, amenable to Twinrix vaccination series as recommended. Will start today. FibroScan completed today, results pending.  He is eager to start treatment asap & agrees to adhere to recommended treatment protocol.  He denies jaundice, icterus, nausea, vomiting, dark urine, or mirtha colored stools.  Endorses extreme fatigue. History of colon cancer, followed by Dr. Porter for same. CT of abd 6/14/23, reading reviewed. All questions answered & concerns addressed.           Past Medical History:   Diagnosis Date    Essential (primary) hypertension     Hepatitis a  "without hepatic coma         Past Surgical History:   Procedure Laterality Date    COLON RESECTION  12/22/2022    INGUINAL HERNIA REPAIR          Social History     Socioeconomic History    Marital status: Single   Tobacco Use    Smoking status: Every Day     Current packs/day: 0.50     Average packs/day: 0.5 packs/day for 38.9 years (19.4 ttl pk-yrs)     Types: Cigarettes     Start date: 1985    Smokeless tobacco: Never   Substance and Sexual Activity    Alcohol use: Not Currently     Comment: no drinking x 20 years    Drug use: Yes     Types: Methamphetamines     Comment: last use 2 days ago, 11/13/2023    Sexual activity: Yes     Partners: Female     Birth control/protection: None        History reviewed. No pertinent family history.     Review of patient's allergies indicates:  No Known Allergies     Immunization History   Administered Date(s) Administered    Hepatitis A / Hepatitis B 07/18/2023, 11/13/2023    Influenza - Quadrivalent - PF *Preferred* (6 months and older) 11/13/2023    Tdap 02/03/2020        Review of Systems   Constitutional: Negative.    HENT: Negative.     Eyes: Negative.    Respiratory: Negative.     Cardiovascular: Negative.    Gastrointestinal: Negative.    Genitourinary: Negative.    Musculoskeletal: Negative.    Skin: Negative.    Neurological: Negative.    Endo/Heme/Allergies: Negative.    Psychiatric/Behavioral: Negative.     All other systems reviewed and are negative.         Objective:      BP (!) 158/90   Pulse 86   Temp 97.6 °F (36.4 °C) (Oral)   Resp 20   Ht 5' 9" (1.753 m)   Wt 65 kg (143 lb 3.2 oz)   BMI 21.15 kg/m²      Physical Exam  Vitals reviewed.   Constitutional:       General: He is not in acute distress.     Appearance: Normal appearance. He is not toxic-appearing.   Eyes:      General: No scleral icterus.  Cardiovascular:      Rate and Rhythm: Normal rate and regular rhythm.      Heart sounds: Normal heart sounds.   Pulmonary:      Effort: Pulmonary effort is " normal. No respiratory distress.      Breath sounds: Normal breath sounds.   Abdominal:      General: Bowel sounds are normal. There is no distension.      Palpations: Abdomen is soft. There is no mass.      Tenderness: There is no abdominal tenderness.   Musculoskeletal:         General: Normal range of motion.   Skin:     General: Skin is warm and dry.   Neurological:      Mental Status: He is alert and oriented to person, place, and time.          Labs:   Lab Results   Component Value Date    WBC 6.94 11/10/2023    HGB 14.0 11/10/2023    HCT 41.4 (L) 11/10/2023    MCV 92.8 11/10/2023     11/10/2023       CMP  Sodium   Date Value Ref Range Status   12/21/2022 137 136 - 145 mmol/L Final     Sodium Level   Date Value Ref Range Status   11/10/2023 140 136 - 145 mmol/L Final     Potassium   Date Value Ref Range Status   12/21/2022 3.7 3.5 - 5.1 mmol/L Final     Potassium Level   Date Value Ref Range Status   11/10/2023 4.4 3.5 - 5.1 mmol/L Final     Chloride   Date Value Ref Range Status   12/21/2022 107 100 - 109 mmol/L Final     Carbon Dioxide   Date Value Ref Range Status   11/10/2023 28 22 - 29 mmol/L Final   12/21/2022 23 22 - 33 mmol/L Final     Blood Urea Nitrogen   Date Value Ref Range Status   11/10/2023 18.9 8.4 - 25.7 mg/dL Final   12/21/2022 11 5 - 25 mg/dL Final     Creatinine   Date Value Ref Range Status   11/10/2023 1.53 (H) 0.73 - 1.18 mg/dL Final   12/21/2022 1.07 0.57 - 1.25 mg/dL Final     Calcium   Date Value Ref Range Status   12/21/2022 8.2 (L) 8.8 - 10.6 mg/dL Final     Calcium Level Total   Date Value Ref Range Status   11/10/2023 9.3 8.4 - 10.2 mg/dL Final     Albumin Level   Date Value Ref Range Status   11/10/2023 4.0 3.5 - 5.0 g/dL Final     Bilirubin Total   Date Value Ref Range Status   11/10/2023 0.4 <=1.5 mg/dL Final     Alkaline Phosphatase   Date Value Ref Range Status   11/10/2023 71 40 - 150 unit/L Final     Aspartate Aminotransferase   Date Value Ref Range Status    11/10/2023 22 5 - 34 unit/L Final     Alanine Aminotransferase   Date Value Ref Range Status   11/10/2023 15 0 - 55 unit/L Final     Anion Gap   Date Value Ref Range Status   12/21/2022 7 (L) 8 - 16 mmol/L Final     eGFR   Date Value Ref Range Status   11/10/2023 52 mls/min/1.73/m2 Final     Lab Results   Component Value Date    TSH 0.3166 (L) 11/30/2022     HCV Genotype   Date Value Ref Range Status   07/06/2023 2 (A) Undetected Final     Comment:        -------------------ADDITIONAL INFORMATION-------------------  This test was performed using the Abbott RealTime HCV   Genotype II assay ([employer] [place], [place]).     Test Performed by:  [place] [address]  : Danyel Lewis M.D. Ph.D.; CLIA# [id number]     HCV RNA Detect/Quant   Date Value Ref Range Status   11/10/2023 Undetected Undetected IU/mL Final     Comment:     Result in log IU/mL is Undetected.     -------------------ADDITIONAL INFORMATION-------------------  The quantification range of this assay is 15 to 100,000,000   IU/mL (1.18 log to 8.00 log IU/mL). Testing was performed   using the marcellus HCV test ([employer]   with the marcellus 6800 System.     Test Performed by:  [place] [address]  : Danyel Lewis M.D. Ph.D.; CLIA# [id number]     HIV   Date Value Ref Range Status   07/06/2023 Nonreactive Nonreactive Final     PT   Date Value Ref Range Status   07/06/2023 14.3 (H) 11.4 - 14.0 seconds Final     INR   Date Value Ref Range Status   07/06/2023 1.1 <=1.3 Corrected     Comment:     This is a corrected result. Previous result was 1.13 on 7/6/2023 at 1024 CDT.   12/19/2022 1.4  Final     Syphilis Antibody   Date Value Ref Range Status   07/06/2023 Nonreactive Nonreactive, Equivocal Final     Hepatitis B Surface Antigen   Date Value Ref  Range Status   07/06/2023 Nonreactive Nonreactive Final     Hepatitis B Surface Antibody   Date Value Ref Range Status   07/06/2023 Nonreactive Nonreactive Final     Hepatitis B Surface Antibody Qnt   Date Value Ref Range Status   07/06/2023 0.38 mIU/mL Final     Comment:     Interpretive Data Hep Bs Ab#  Result (mIU/mL)Interpretation - Hep B Surface Antibody  <8.00Nonreactive: Patient considered not immune to HBV infection  >=8.00 to <12.00Grayzone: Unable to determine immune status. Follow-up testing should be performed  >=12.00Reactive: Patient considered immune to HBV infection       Hepatitis B Core Antibody   Date Value Ref Range Status   07/06/2023 Nonreactive Nonreactive Final     Hep A IGG   Date Value Ref Range Status   07/06/2023 Nonreactive Nonreactive Final     FibroTest Stage   Date Value Ref Range Status   07/06/2023 F0  Final     ActiTest Grade   Date Value Ref Range Status   07/06/2023 A0-A1  Final     Alpha-2-Macroglobulin   Date Value Ref Range Status   07/06/2023 221 100 - 280 mg/dL Final     Haptoglobin   Date Value Ref Range Status   07/06/2023 149 30 - 200 mg/dL Final     Alanine Aminotransferase (ALT)   Date Value Ref Range Status   07/06/2023 42 7 - 55 U/L Final     Gamma Glutamyltransferase (GGT)   Date Value Ref Range Status   07/06/2023 18 8 - 61 U/L Final     Bilirubin, Total   Date Value Ref Range Status   07/06/2023 0.2 <=1.2 mg/dL Final     Comment:        Test Performed by:  [facility]  [address] [place] [address]  : Danyel Lewis M.D. Ph.D.; CLIA# [id number]     Test Performed by:  [facility]  [address] [place] [address]  : Danyel Lewis M.D. Ph.D.; CLIA# [id number]     Ferritin Level   Date Value Ref Range Status   07/06/2023 29.53 21.81 - 274.66 ng/mL Final       Imaging: Reviewed most recent relevant imaging studies available, notable results  highlighted in this note      Medications:     Current Outpatient Medications   Medication Instructions    furosemide (LASIX) 40 mg, Oral    hyoscyamine (LEVSIN/SL) 0.125 mg, Sublingual, Every 4 hours PRN    lisinopriL (PRINIVIL,ZESTRIL) 20 mg, Oral, Daily    ondansetron (ZOFRAN-ODT) 4 mg, Oral, Every 8 hours PRN    promethazine (PHENERGAN) 25 mg, Oral, Every 6 hours PRN    SYMBICORT 160-4.5 mcg/actuation HFAA Inhalation       Assessment:       Problem List Items Addressed This Visit    None  Visit Diagnoses       Chronic hepatitis C without hepatic coma    -  Primary    Relevant Orders    Hepatitis C Virus Quantitative    Need for vaccination        Relevant Medications    hepatitis A and B vaccine (PF) 720 CALIXTO unit- 20 mcg/mL suspension 720 Units (Completed)    influenza (QUADRIVALENT PF) vaccine 0.5 mL (Completed)               Plan:      Chronic hepatitis C without hepatic coma  -     Hepatitis C Virus Quantitative; Future; Expected date: 02/13/2024  Diagnosed 6/2023  Treatment naive.  GT 2, baseline VL [address].  Fibrosure: 7/6/23 A0-1, F0.  FibroScan 7/18/23 S0, F0-1.  CT abd 6/14/23: hemangiomas to liver noted.  Blood precautions: do not share a razor, needle, toothbrush, clippers with anyone.  Avoid all illicits & alcohol.   Increase water intake.  Completed Epclusa 1 po daily x 12 weeks, 8/14/23-11/6/23.  11/10/23 HCV RNA not detected.    RTC 3 months with Frieda.  SVR 12 labs 1 week prior.    Need for vaccination  -     hepatitis A and B vaccine (PF) 720 CALIXTO unit- 20 mcg/mL suspension 720 Units  -     influenza (QUADRIVALENT PF) vaccine 0.5 mL  Twinrix #2 & Flu vaccine today.   Twinrix #3 next visit.

## 2023-12-08 PROBLEM — D3A.8: Status: ACTIVE | Noted: 2023-12-08

## 2023-12-11 ENCOUNTER — OFFICE VISIT (OUTPATIENT)
Dept: HEMATOLOGY/ONCOLOGY | Facility: CLINIC | Age: 58
End: 2023-12-11
Attending: INTERNAL MEDICINE
Payer: MEDICAID

## 2023-12-11 ENCOUNTER — APPOINTMENT (OUTPATIENT)
Dept: HEMATOLOGY/ONCOLOGY | Facility: CLINIC | Age: 58
End: 2023-12-11
Payer: MEDICAID

## 2023-12-11 VITALS
WEIGHT: 152.81 LBS | DIASTOLIC BLOOD PRESSURE: 100 MMHG | OXYGEN SATURATION: 100 % | SYSTOLIC BLOOD PRESSURE: 160 MMHG | HEART RATE: 92 BPM | BODY MASS INDEX: 21.88 KG/M2 | TEMPERATURE: 98 F | HEIGHT: 70 IN

## 2023-12-11 DIAGNOSIS — R91.8 LUNG NODULES: ICD-10-CM

## 2023-12-11 DIAGNOSIS — D3A.8 NEUROENDOCRINE NEOPLASM OF COLON: Primary | ICD-10-CM

## 2023-12-11 DIAGNOSIS — K76.9 LIVER LESION, RIGHT LOBE: ICD-10-CM

## 2023-12-11 DIAGNOSIS — C7A.1 HIGH GRADE NEUROENDOCRINE CARCINOMA OF LARGE INTESTINE: Primary | ICD-10-CM

## 2023-12-11 DIAGNOSIS — D3A.8 NEUROENDOCRINE NEOPLASM OF COLON: ICD-10-CM

## 2023-12-11 DIAGNOSIS — R10.9 SUDDEN ONSET OF SEVERE ABDOMINAL PAIN: ICD-10-CM

## 2023-12-11 DIAGNOSIS — C7A.1 HIGH GRADE NEUROENDOCRINE CARCINOMA OF LARGE INTESTINE: ICD-10-CM

## 2023-12-11 DIAGNOSIS — C7A.8 PRIMARY MALIGNANT NEUROENDOCRINE TUMOR OF CECUM: ICD-10-CM

## 2023-12-11 LAB
ALBUMIN SERPL-MCNC: 3.9 G/DL (ref 3.5–5)
ALBUMIN/GLOB SERPL: 1.2 RATIO (ref 1.1–2)
ALP SERPL-CCNC: 80 UNIT/L (ref 40–150)
ALT SERPL-CCNC: 17 UNIT/L (ref 0–55)
AST SERPL-CCNC: 21 UNIT/L (ref 5–34)
BASOPHILS # BLD AUTO: 0.03 X10(3)/MCL
BASOPHILS NFR BLD AUTO: 0.4 %
BILIRUB SERPL-MCNC: 0.3 MG/DL
BUN SERPL-MCNC: 24 MG/DL (ref 8.4–25.7)
CALCIUM SERPL-MCNC: 9.1 MG/DL (ref 8.4–10.2)
CHLORIDE SERPL-SCNC: 107 MMOL/L (ref 98–107)
CO2 SERPL-SCNC: 21 MMOL/L (ref 22–29)
CREAT SERPL-MCNC: 1.48 MG/DL (ref 0.73–1.18)
EOSINOPHIL # BLD AUTO: 0.28 X10(3)/MCL (ref 0–0.9)
EOSINOPHIL NFR BLD AUTO: 3.8 %
ERYTHROCYTE [DISTWIDTH] IN BLOOD BY AUTOMATED COUNT: 12.6 % (ref 11.5–17)
GFR SERPLBLD CREATININE-BSD FMLA CKD-EPI: 55 MLS/MIN/1.73/M2
GLOBULIN SER-MCNC: 3.2 GM/DL (ref 2.4–3.5)
GLUCOSE SERPL-MCNC: 156 MG/DL (ref 74–100)
HCT VFR BLD AUTO: 42.2 % (ref 42–52)
HGB BLD-MCNC: 14.2 G/DL (ref 14–18)
IMM GRANULOCYTES # BLD AUTO: 0.02 X10(3)/MCL (ref 0–0.04)
IMM GRANULOCYTES NFR BLD AUTO: 0.3 %
LYMPHOCYTES # BLD AUTO: 1.96 X10(3)/MCL (ref 0.6–4.6)
LYMPHOCYTES NFR BLD AUTO: 26.5 %
MCH RBC QN AUTO: 30.9 PG (ref 27–31)
MCHC RBC AUTO-ENTMCNC: 33.6 G/DL (ref 33–36)
MCV RBC AUTO: 91.9 FL (ref 80–94)
MONOCYTES # BLD AUTO: 0.53 X10(3)/MCL (ref 0.1–1.3)
MONOCYTES NFR BLD AUTO: 7.2 %
NEUTROPHILS # BLD AUTO: 4.58 X10(3)/MCL (ref 2.1–9.2)
NEUTROPHILS NFR BLD AUTO: 61.8 %
NRBC BLD AUTO-RTO: 0 %
PLATELET # BLD AUTO: 218 X10(3)/MCL (ref 130–400)
PMV BLD AUTO: 9.4 FL (ref 7.4–10.4)
POTASSIUM SERPL-SCNC: 4.3 MMOL/L (ref 3.5–5.1)
PROT SERPL-MCNC: 7.1 GM/DL (ref 6.4–8.3)
RBC # BLD AUTO: 4.59 X10(6)/MCL (ref 4.7–6.1)
SODIUM SERPL-SCNC: 138 MMOL/L (ref 136–145)
WBC # SPEC AUTO: 7.4 X10(3)/MCL (ref 4.5–11.5)

## 2023-12-11 PROCEDURE — 1160F PR REVIEW ALL MEDS BY PRESCRIBER/CLIN PHARMACIST DOCUMENTED: ICD-10-PCS | Mod: CPTII,,, | Performed by: INTERNAL MEDICINE

## 2023-12-11 PROCEDURE — 80053 COMPREHEN METABOLIC PANEL: CPT

## 2023-12-11 PROCEDURE — 36415 COLL VENOUS BLD VENIPUNCTURE: CPT

## 2023-12-11 PROCEDURE — 3008F BODY MASS INDEX DOCD: CPT | Mod: CPTII,,, | Performed by: INTERNAL MEDICINE

## 2023-12-11 PROCEDURE — 99214 OFFICE O/P EST MOD 30 MIN: CPT | Mod: S$PBB,,, | Performed by: INTERNAL MEDICINE

## 2023-12-11 PROCEDURE — 3077F PR MOST RECENT SYSTOLIC BLOOD PRESSURE >= 140 MM HG: ICD-10-PCS | Mod: CPTII,,, | Performed by: INTERNAL MEDICINE

## 2023-12-11 PROCEDURE — 99213 OFFICE O/P EST LOW 20 MIN: CPT | Mod: PBBFAC | Performed by: INTERNAL MEDICINE

## 2023-12-11 PROCEDURE — 85025 COMPLETE CBC W/AUTO DIFF WBC: CPT

## 2023-12-11 PROCEDURE — 3077F SYST BP >= 140 MM HG: CPT | Mod: CPTII,,, | Performed by: INTERNAL MEDICINE

## 2023-12-11 PROCEDURE — 4010F PR ACE/ARB THEARPY RXD/TAKEN: ICD-10-PCS | Mod: CPTII,,, | Performed by: INTERNAL MEDICINE

## 2023-12-11 PROCEDURE — 1159F PR MEDICATION LIST DOCUMENTED IN MEDICAL RECORD: ICD-10-PCS | Mod: CPTII,,, | Performed by: INTERNAL MEDICINE

## 2023-12-11 PROCEDURE — 3080F DIAST BP >= 90 MM HG: CPT | Mod: CPTII,,, | Performed by: INTERNAL MEDICINE

## 2023-12-11 PROCEDURE — 1159F MED LIST DOCD IN RCRD: CPT | Mod: CPTII,,, | Performed by: INTERNAL MEDICINE

## 2023-12-11 PROCEDURE — 3080F PR MOST RECENT DIASTOLIC BLOOD PRESSURE >= 90 MM HG: ICD-10-PCS | Mod: CPTII,,, | Performed by: INTERNAL MEDICINE

## 2023-12-11 PROCEDURE — 3008F PR BODY MASS INDEX (BMI) DOCUMENTED: ICD-10-PCS | Mod: CPTII,,, | Performed by: INTERNAL MEDICINE

## 2023-12-11 PROCEDURE — 1160F RVW MEDS BY RX/DR IN RCRD: CPT | Mod: CPTII,,, | Performed by: INTERNAL MEDICINE

## 2023-12-11 PROCEDURE — 99214 PR OFFICE/OUTPT VISIT, EST, LEVL IV, 30-39 MIN: ICD-10-PCS | Mod: S$PBB,,, | Performed by: INTERNAL MEDICINE

## 2023-12-11 PROCEDURE — 4010F ACE/ARB THERAPY RXD/TAKEN: CPT | Mod: CPTII,,, | Performed by: INTERNAL MEDICINE

## 2023-12-11 NOTE — Clinical Note
This is time for repeat multiphasic contrast-enhanced CT scans of A/P, and contrast-enhanced CT scan of chest for surveillance Today, check 24 hour urine for 5-HIAA level Follow-up visit with me in 3 weeks with reports.

## 2023-12-19 DIAGNOSIS — C7A.1 HIGH GRADE NEUROENDOCRINE CARCINOMA OF LARGE INTESTINE: Primary | ICD-10-CM

## 2023-12-19 DIAGNOSIS — D3A.8 NEUROENDOCRINE NEOPLASM OF COLON: ICD-10-CM

## 2024-01-01 ENCOUNTER — HOSPITAL ENCOUNTER (OUTPATIENT)
Dept: RADIOLOGY | Facility: HOSPITAL | Age: 59
Discharge: HOME | End: 2024-01-05
Attending: INTERNAL MEDICINE
Payer: MEDICAID

## 2024-01-01 ENCOUNTER — TELEPHONE (OUTPATIENT)
Dept: INFECTIOUS DISEASES | Facility: CLINIC | Age: 59
End: 2024-01-01
Payer: MEDICAID

## 2024-01-01 ENCOUNTER — OFFICE VISIT (OUTPATIENT)
Dept: INTERNAL MEDICINE | Facility: CLINIC | Age: 59
End: 2024-01-01
Payer: MEDICAID

## 2024-01-01 ENCOUNTER — OFFICE VISIT (OUTPATIENT)
Dept: INFECTIOUS DISEASES | Facility: CLINIC | Age: 59
End: 2024-01-01
Payer: MEDICAID

## 2024-01-01 ENCOUNTER — TELEPHONE (OUTPATIENT)
Dept: INTERNAL MEDICINE | Facility: CLINIC | Age: 59
End: 2024-01-01

## 2024-01-01 ENCOUNTER — OFFICE VISIT (OUTPATIENT)
Dept: HEMATOLOGY/ONCOLOGY | Facility: CLINIC | Age: 59
End: 2024-01-01
Attending: INTERNAL MEDICINE
Payer: MEDICAID

## 2024-01-01 ENCOUNTER — HOSPITAL ENCOUNTER (INPATIENT)
Facility: HOSPITAL | Age: 59
LOS: 2 days | DRG: 918 | End: 2024-03-23
Attending: EMERGENCY MEDICINE | Admitting: INTERNAL MEDICINE
Payer: MEDICAID

## 2024-01-01 VITALS
HEART RATE: 32 BPM | HEIGHT: 60 IN | BODY MASS INDEX: 30.23 KG/M2 | OXYGEN SATURATION: 80 % | DIASTOLIC BLOOD PRESSURE: 40 MMHG | WEIGHT: 154 LBS | TEMPERATURE: 98 F | SYSTOLIC BLOOD PRESSURE: 90 MMHG | RESPIRATION RATE: 20 BRPM

## 2024-01-01 VITALS
HEART RATE: 93 BPM | DIASTOLIC BLOOD PRESSURE: 90 MMHG | TEMPERATURE: 98 F | OXYGEN SATURATION: 98 % | SYSTOLIC BLOOD PRESSURE: 145 MMHG | WEIGHT: 151.63 LBS | HEIGHT: 70 IN | RESPIRATION RATE: 18 BRPM | BODY MASS INDEX: 21.71 KG/M2

## 2024-01-01 VITALS
HEIGHT: 70 IN | RESPIRATION RATE: 16 BRPM | WEIGHT: 148 LBS | DIASTOLIC BLOOD PRESSURE: 88 MMHG | HEART RATE: 92 BPM | BODY MASS INDEX: 21.19 KG/M2 | SYSTOLIC BLOOD PRESSURE: 138 MMHG | TEMPERATURE: 98 F

## 2024-01-01 VITALS
DIASTOLIC BLOOD PRESSURE: 93 MMHG | HEART RATE: 99 BPM | RESPIRATION RATE: 17 BRPM | OXYGEN SATURATION: 97 % | WEIGHT: 150.81 LBS | TEMPERATURE: 98 F | BODY MASS INDEX: 21.59 KG/M2 | HEIGHT: 70 IN | SYSTOLIC BLOOD PRESSURE: 155 MMHG

## 2024-01-01 DIAGNOSIS — I46.9 CARDIAC ARREST: ICD-10-CM

## 2024-01-01 DIAGNOSIS — C7A.1 HIGH GRADE NEUROENDOCRINE CARCINOMA OF LARGE INTESTINE: ICD-10-CM

## 2024-01-01 DIAGNOSIS — D3A.8 NEUROENDOCRINE NEOPLASM OF COLON: ICD-10-CM

## 2024-01-01 DIAGNOSIS — Z23 NEED FOR VACCINATION: ICD-10-CM

## 2024-01-01 DIAGNOSIS — R10.9 SUDDEN ONSET OF SEVERE ABDOMINAL PAIN: ICD-10-CM

## 2024-01-01 DIAGNOSIS — C7A.8 PRIMARY MALIGNANT NEUROENDOCRINE TUMOR OF CECUM: ICD-10-CM

## 2024-01-01 DIAGNOSIS — K76.9 LIVER LESION, RIGHT LOBE: ICD-10-CM

## 2024-01-01 DIAGNOSIS — R07.9 CHEST PAIN, UNSPECIFIED TYPE: Primary | ICD-10-CM

## 2024-01-01 DIAGNOSIS — I50.9 HEART FAILURE, UNSPECIFIED HF CHRONICITY, UNSPECIFIED HEART FAILURE TYPE: ICD-10-CM

## 2024-01-01 DIAGNOSIS — R91.8 LUNG NODULES: ICD-10-CM

## 2024-01-01 DIAGNOSIS — Z86.19 HISTORY OF HEPATITIS C: ICD-10-CM

## 2024-01-01 DIAGNOSIS — B18.2 CHRONIC HEPATITIS C WITHOUT HEPATIC COMA: Primary | ICD-10-CM

## 2024-01-01 DIAGNOSIS — R91.8 LUNG NODULES: Primary | ICD-10-CM

## 2024-01-01 DIAGNOSIS — C7A.1 HIGH GRADE NEUROENDOCRINE CARCINOMA OF LARGE INTESTINE: Primary | ICD-10-CM

## 2024-01-01 DIAGNOSIS — R07.9 CHEST PAIN: ICD-10-CM

## 2024-01-01 DIAGNOSIS — E03.9 HYPOTHYROIDISM, UNSPECIFIED TYPE: Primary | ICD-10-CM

## 2024-01-01 LAB
A-ADO2 BLOOD GAS (OHS): 109 MMHG
A-ADO2 BLOOD GAS (OHS): 447 MMHG
A-ADO2 BLOOD GAS (OHS): 83 MMHG
ALBUMIN SERPL-MCNC: 2.5 G/DL (ref 3.5–5)
ALBUMIN SERPL-MCNC: 2.9 G/DL (ref 3.5–5)
ALBUMIN SERPL-MCNC: 3.8 G/DL (ref 3.5–5)
ALBUMIN SERPL-MCNC: 4 G/DL (ref 3.5–5)
ALBUMIN/GLOB SERPL: 0.8 RATIO (ref 1.1–2)
ALBUMIN/GLOB SERPL: 1.2 RATIO (ref 1.1–2)
ALBUMIN/GLOB SERPL: 1.2 RATIO (ref 1.1–2)
ALBUMIN/GLOB SERPL: 1.3 RATIO (ref 1.1–2)
ALLENS TEST BLOOD GAS (OHS): ABNORMAL
ALLENS TEST BLOOD GAS (OHS): ABNORMAL
ALLENS TEST BLOOD GAS (OHS): YES
ALLENS TEST BLOOD GAS (OHS): YES
ALP SERPL-CCNC: 109 UNIT/L (ref 40–150)
ALP SERPL-CCNC: 70 UNIT/L (ref 40–150)
ALP SERPL-CCNC: 78 UNIT/L (ref 40–150)
ALP SERPL-CCNC: 96 UNIT/L (ref 40–150)
ALT SERPL-CCNC: 103 UNIT/L (ref 0–55)
ALT SERPL-CCNC: 54 UNIT/L (ref 0–55)
ALT SERPL-CCNC: 91 UNIT/L (ref 0–55)
ALT SERPL-CCNC: 98 UNIT/L (ref 0–55)
AMPHET UR QL SCN: POSITIVE
ANION GAP SERPL CALC-SCNC: 12 MEQ/L
ANION GAP SERPL CALC-SCNC: 17 MEQ/L
ANION GAP SERPL CALC-SCNC: 23 MEQ/L
APAP SERPL-MCNC: <17.4 UG/ML (ref 17.4–30)
APPEARANCE UR: ABNORMAL
APTT PPP: 28.3 SECONDS (ref 23.2–33.7)
APTT PPP: 31.7 SECONDS (ref 23.2–33.7)
AST SERPL-CCNC: 187 UNIT/L (ref 5–34)
AST SERPL-CCNC: 201 UNIT/L (ref 5–34)
AST SERPL-CCNC: 272 UNIT/L (ref 5–34)
AST SERPL-CCNC: 78 UNIT/L (ref 5–34)
BACTERIA #/AREA URNS AUTO: ABNORMAL /HPF
BARBITURATE SCN PRESENT UR: NEGATIVE
BASE EXCESS BLD CALC-SCNC: -5.8 MMOL/L (ref -2–2)
BASE EXCESS BLD CALC-SCNC: -6.4 MMOL/L (ref -2–2)
BASE EXCESS BLD CALC-SCNC: -7.2 MMOL/L (ref -2–2)
BASE EXCESS BLD CALC-SCNC: 1 MMOL/L (ref -2–2)
BASOPHILS # BLD AUTO: 0.03 X10(3)/MCL
BASOPHILS # BLD AUTO: 0.05 X10(3)/MCL
BASOPHILS # BLD AUTO: 0.08 X10(3)/MCL
BASOPHILS NFR BLD AUTO: 0.3 %
BASOPHILS NFR BLD AUTO: 0.3 %
BASOPHILS NFR BLD AUTO: 0.8 %
BENZODIAZ UR QL SCN: NEGATIVE
BILIRUB SERPL-MCNC: 0.5 MG/DL
BILIRUB SERPL-MCNC: 0.5 MG/DL
BILIRUB SERPL-MCNC: 0.7 MG/DL
BILIRUB SERPL-MCNC: 1 MG/DL
BILIRUB UR QL STRIP.AUTO: NEGATIVE
BLOOD GAS SAMPLE TYPE (OHS): ABNORMAL
BNP BLD-MCNC: 326 PG/ML
BSA FOR ECHO PROCEDURE: 1.72 M2
BUN SERPL-MCNC: 18 MG/DL (ref 8.4–25.7)
BUN SERPL-MCNC: 25.5 MG/DL (ref 8.4–25.7)
BUN SERPL-MCNC: 29.9 MG/DL (ref 8.4–25.7)
BUN SERPL-MCNC: 38.9 MG/DL (ref 8.4–25.7)
BUN SERPL-MCNC: 47.4 MG/DL (ref 8.4–25.7)
BUN SERPL-MCNC: 55.7 MG/DL (ref 8.4–25.7)
BUN SERPL-MCNC: 63.2 MG/DL (ref 8.4–25.7)
BUN SERPL-MCNC: 67 MG/DL (ref 8.4–25.7)
BUN SERPL-MCNC: 69.4 MG/DL (ref 8.4–25.7)
CA-I ADJ PH7.4 SERPL ISE-MCNC: 4.24 MG/DL (ref 4.57–5.43)
CALCIUM SERPL-MCNC: 6.9 MG/DL (ref 8.4–10.2)
CALCIUM SERPL-MCNC: 7 MG/DL (ref 8.4–10.2)
CALCIUM SERPL-MCNC: 7 MG/DL (ref 8.4–10.2)
CALCIUM SERPL-MCNC: 7.7 MG/DL (ref 8.4–10.2)
CALCIUM SERPL-MCNC: 8.1 MG/DL (ref 8.4–10.2)
CALCIUM SERPL-MCNC: 8.3 MG/DL (ref 8.4–10.2)
CALCIUM SERPL-MCNC: 8.7 MG/DL (ref 8.4–10.2)
CALCIUM SERPL-MCNC: 8.8 MG/DL (ref 8.4–10.2)
CALCIUM SERPL-MCNC: 9.1 MG/DL (ref 8.4–10.2)
CANNABINOIDS UR QL SCN: NEGATIVE
CASTS URNS MICRO: ABNORMAL /LPF
CHLORIDE SERPL-SCNC: 102 MMOL/L (ref 98–107)
CHLORIDE SERPL-SCNC: 107 MMOL/L (ref 98–107)
CHLORIDE SERPL-SCNC: 107 MMOL/L (ref 98–107)
CHLORIDE SERPL-SCNC: 109 MMOL/L (ref 98–107)
CHLORIDE SERPL-SCNC: 91 MMOL/L (ref 98–107)
CHLORIDE SERPL-SCNC: 94 MMOL/L (ref 98–107)
CHLORIDE SERPL-SCNC: 97 MMOL/L (ref 98–107)
CK MB SERPL-MCNC: 10.9 NG/ML
CK SERPL-CCNC: 362 U/L (ref 30–200)
CO2 BLDA-SCNC: 17.1 MMOL/L (ref 22–26)
CO2 BLDA-SCNC: 18.2 MMOL/L (ref 22–26)
CO2 BLDA-SCNC: 22 MMOL/L (ref 22–26)
CO2 BLDA-SCNC: 26.4 MMOL/L (ref 22–26)
CO2 SERPL-SCNC: 14 MMOL/L (ref 22–29)
CO2 SERPL-SCNC: 15 MMOL/L (ref 22–29)
CO2 SERPL-SCNC: 16 MMOL/L (ref 22–29)
CO2 SERPL-SCNC: 16 MMOL/L (ref 22–29)
CO2 SERPL-SCNC: 17 MMOL/L (ref 22–29)
CO2 SERPL-SCNC: 19 MMOL/L (ref 22–29)
CO2 SERPL-SCNC: 20 MMOL/L (ref 22–29)
CO2 SERPL-SCNC: 22 MMOL/L (ref 22–29)
CO2 SERPL-SCNC: 22 MMOL/L (ref 22–29)
COCAINE UR QL SCN: NEGATIVE
COHGB MFR BLDA: 0.2 % (ref 0.5–1.5)
COHGB MFR BLDA: 0.8 % (ref 0.5–1.5)
COHGB MFR BLDA: 0.9 % (ref 0.5–1.5)
COHGB MFR BLDA: 1 % (ref 0.5–1.5)
COLOR UR AUTO: YELLOW
CREAT SERPL-MCNC: 1.61 MG/DL (ref 0.73–1.18)
CREAT SERPL-MCNC: 1.97 MG/DL (ref 0.73–1.18)
CREAT SERPL-MCNC: 2.41 MG/DL (ref 0.73–1.18)
CREAT SERPL-MCNC: 2.58 MG/DL (ref 0.73–1.18)
CREAT SERPL-MCNC: 2.72 MG/DL (ref 0.73–1.18)
CREAT SERPL-MCNC: 3.06 MG/DL (ref 0.73–1.18)
CREAT SERPL-MCNC: 3.32 MG/DL (ref 0.73–1.18)
CREAT SERPL-MCNC: 3.48 MG/DL (ref 0.73–1.18)
CREAT SERPL-MCNC: 4.07 MG/DL (ref 0.73–1.18)
CREAT/UREA NIT SERPL: 12
CREAT/UREA NIT SERPL: 17
CREAT/UREA NIT SERPL: 17
CREAT/UREA NIT SERPL: 18
CREAT/UREA NIT SERPL: 19
CV ECHO LV RWT: 1.07 CM
DOP CALC LVOT AREA: 3 CM2
DOP CALC LVOT DIAMETER: 1.97 CM
DRAWN BY BLOOD GAS (OHS): ABNORMAL
ECHO LV POSTERIOR WALL: 1.77 CM (ref 0.6–1.1)
EOSINOPHIL # BLD AUTO: 0 X10(3)/MCL (ref 0–0.9)
EOSINOPHIL # BLD AUTO: 0.09 X10(3)/MCL (ref 0–0.9)
EOSINOPHIL # BLD AUTO: 0.2 X10(3)/MCL (ref 0–0.9)
EOSINOPHIL NFR BLD AUTO: 0 %
EOSINOPHIL NFR BLD AUTO: 0.5 %
EOSINOPHIL NFR BLD AUTO: 1.9 %
ERYTHROCYTE [DISTWIDTH] IN BLOOD BY AUTOMATED COUNT: 12.2 % (ref 11.5–17)
ERYTHROCYTE [DISTWIDTH] IN BLOOD BY AUTOMATED COUNT: 12.3 % (ref 11.5–17)
ERYTHROCYTE [DISTWIDTH] IN BLOOD BY AUTOMATED COUNT: 12.3 % (ref 11.5–17)
EST. AVERAGE GLUCOSE BLD GHB EST-MCNC: 93.9 MG/DL
ETHANOL SERPL-MCNC: <10 MG/DL
FENTANYL UR QL SCN: NEGATIVE
FRACTIONAL SHORTENING: 13 % (ref 28–44)
GAS PNL BLD: 179 MMHG
GAS PNL BLD: 248 MMHG
GAS PNL BLD: 659 MMHG
GAS PNL BLD: 666 MMHG
GFR SERPLBLD CREATININE-BSD FMLA CKD-EPI: 16 MLS/MIN/1.73/M2
GFR SERPLBLD CREATININE-BSD FMLA CKD-EPI: 20 MLS/MIN/1.73/M2
GFR SERPLBLD CREATININE-BSD FMLA CKD-EPI: 21 MLS/MIN/1.73/M2
GFR SERPLBLD CREATININE-BSD FMLA CKD-EPI: 23 MLS/MIN/1.73/M2
GFR SERPLBLD CREATININE-BSD FMLA CKD-EPI: 26 MLS/MIN/1.73/M2
GFR SERPLBLD CREATININE-BSD FMLA CKD-EPI: 28 MLS/MIN/1.73/M2
GFR SERPLBLD CREATININE-BSD FMLA CKD-EPI: 30 MLS/MIN/1.73/M2
GFR SERPLBLD CREATININE-BSD FMLA CKD-EPI: 39 MLS/MIN/1.73/M2
GFR SERPLBLD CREATININE-BSD FMLA CKD-EPI: 49 MLS/MIN/1.73/M2
GLOBULIN SER-MCNC: 2.3 GM/DL (ref 2.4–3.5)
GLOBULIN SER-MCNC: 3 GM/DL (ref 2.4–3.5)
GLOBULIN SER-MCNC: 3.1 GM/DL (ref 2.4–3.5)
GLOBULIN SER-MCNC: 3.3 GM/DL (ref 2.4–3.5)
GLUCOSE SERPL-MCNC: 115 MG/DL (ref 74–100)
GLUCOSE SERPL-MCNC: 137 MG/DL (ref 74–100)
GLUCOSE SERPL-MCNC: 144 MG/DL (ref 74–100)
GLUCOSE SERPL-MCNC: 163 MG/DL (ref 74–100)
GLUCOSE SERPL-MCNC: 172 MG/DL (ref 74–100)
GLUCOSE SERPL-MCNC: 173 MG/DL (ref 74–100)
GLUCOSE SERPL-MCNC: 195 MG/DL (ref 74–100)
GLUCOSE SERPL-MCNC: 212 MG/DL (ref 74–100)
GLUCOSE SERPL-MCNC: 330 MG/DL (ref 74–100)
GLUCOSE SERPL-MCNC: 91 MG/DL (ref 74–100)
GLUCOSE UR QL STRIP.AUTO: ABNORMAL
GRAM STN SPEC: NORMAL
HAV IGM SERPL QL IA: NONREACTIVE
HBA1C MFR BLD: 4.9 %
HBV CORE IGM SERPL QL IA: NONREACTIVE
HBV SURFACE AG SERPL QL IA: NONREACTIVE
HCO3 BLDA-SCNC: 16.2 MMOL/L (ref 22–26)
HCO3 BLDA-SCNC: 17.3 MMOL/L (ref 22–26)
HCO3 BLDA-SCNC: 20.7 MMOL/L (ref 22–26)
HCO3 BLDA-SCNC: 25.2 MMOL/L (ref 22–26)
HCT VFR BLD AUTO: 39.8 % (ref 42–52)
HCT VFR BLD AUTO: 45 % (ref 42–52)
HCT VFR BLD AUTO: 51 % (ref 42–52)
HCV AB SERPL QL IA: REACTIVE
HCV RNA SERPL NAA+PROBE-ACNC: NORMAL IU/ML
HCV RNA SERPL NAA+PROBE-ACNC: NORMAL IU/ML
HGB BLD-MCNC: 13.2 G/DL (ref 14–18)
HGB BLD-MCNC: 16.2 G/DL (ref 14–18)
HGB BLD-MCNC: 17.3 G/DL (ref 14–18)
HIV 1+2 AB+HIV1 P24 AG SERPL QL IA: NONREACTIVE
HOLD SPECIMEN: NORMAL
HYALINE CASTS #/AREA URNS LPF: ABNORMAL /LPF
IMM GRANULOCYTES # BLD AUTO: 0.05 X10(3)/MCL (ref 0–0.04)
IMM GRANULOCYTES # BLD AUTO: 0.1 X10(3)/MCL (ref 0–0.04)
IMM GRANULOCYTES # BLD AUTO: 0.28 X10(3)/MCL (ref 0–0.04)
IMM GRANULOCYTES NFR BLD AUTO: 0.5 %
IMM GRANULOCYTES NFR BLD AUTO: 0.5 %
IMM GRANULOCYTES NFR BLD AUTO: 2.7 %
INHALED O2 CONCENTRATION: 100 %
INHALED O2 CONCENTRATION: 100 %
INHALED O2 CONCENTRATION: 30 %
INHALED O2 CONCENTRATION: 40 %
INR PPP: 1.4
INR PPP: 1.4
INR PPP: 1.7
INTERVENTRICULAR SEPTUM: 1.3 CM (ref 0.6–1.1)
KETONES UR QL STRIP.AUTO: NEGATIVE
LACTATE SERPL-SCNC: 2.5 MMOL/L (ref 0.5–2.2)
LACTATE SERPL-SCNC: 3 MMOL/L (ref 0.5–2.2)
LACTATE SERPL-SCNC: 7.9 MMOL/L (ref 0.5–2.2)
LEFT ATRIUM SIZE: 3.2 CM
LEFT INTERNAL DIMENSION IN SYSTOLE: 2.89 CM (ref 2.1–4)
LEFT VENTRICLE DIASTOLIC VOLUME INDEX: 26.59 ML/M2
LEFT VENTRICLE DIASTOLIC VOLUME: 44.41 ML
LEFT VENTRICLE MASS INDEX: 112 G/M2
LEFT VENTRICLE SYSTOLIC VOLUME INDEX: 19.2 ML/M2
LEFT VENTRICLE SYSTOLIC VOLUME: 32.05 ML
LEFT VENTRICULAR INTERNAL DIMENSION IN DIASTOLE: 3.31 CM (ref 3.5–6)
LEFT VENTRICULAR MASS: 186.49 G
LEUKOCYTE ESTERASE UR QL STRIP.AUTO: NEGATIVE
LYMPHOCYTES # BLD AUTO: 0.86 X10(3)/MCL (ref 0.6–4.6)
LYMPHOCYTES # BLD AUTO: 0.91 X10(3)/MCL (ref 0.6–4.6)
LYMPHOCYTES # BLD AUTO: 3.54 X10(3)/MCL (ref 0.6–4.6)
LYMPHOCYTES NFR BLD AUTO: 34.1 %
LYMPHOCYTES NFR BLD AUTO: 4.6 %
LYMPHOCYTES NFR BLD AUTO: 8.6 %
MAGNESIUM SERPL-MCNC: 2.2 MG/DL (ref 1.6–2.6)
MAGNESIUM SERPL-MCNC: 2.3 MG/DL (ref 1.6–2.6)
MAGNESIUM SERPL-MCNC: 2.4 MG/DL (ref 1.6–2.6)
MAGNESIUM SERPL-MCNC: 2.4 MG/DL (ref 1.6–2.6)
MAGNESIUM SERPL-MCNC: 2.5 MG/DL (ref 1.6–2.6)
MCH RBC QN AUTO: 31.3 PG (ref 27–31)
MCH RBC QN AUTO: 31.3 PG (ref 27–31)
MCH RBC QN AUTO: 32 PG (ref 27–31)
MCHC RBC AUTO-ENTMCNC: 33.2 G/DL (ref 33–36)
MCHC RBC AUTO-ENTMCNC: 33.9 G/DL (ref 33–36)
MCHC RBC AUTO-ENTMCNC: 36 G/DL (ref 33–36)
MCV RBC AUTO: 86.9 FL (ref 80–94)
MCV RBC AUTO: 92.2 FL (ref 80–94)
MCV RBC AUTO: 96.6 FL (ref 80–94)
MDMA UR QL SCN: NEGATIVE
MECH RR (OHS): 20 B/MIN
METHGB MFR BLDA: 0.7 % (ref 0–1.5)
METHGB MFR BLDA: 0.7 % (ref 0–1.5)
METHGB MFR BLDA: 1.8 % (ref 0–1.5)
METHGB MFR BLDA: 3.4 % (ref 0–1.5)
MONOCYTES # BLD AUTO: 0.34 X10(3)/MCL (ref 0.1–1.3)
MONOCYTES # BLD AUTO: 0.99 X10(3)/MCL (ref 0.1–1.3)
MONOCYTES # BLD AUTO: 1.04 X10(3)/MCL (ref 0.1–1.3)
MONOCYTES NFR BLD AUTO: 3.3 %
MONOCYTES NFR BLD AUTO: 5.3 %
MONOCYTES NFR BLD AUTO: 9.8 %
MRSA PCR SCRN (OHS): NOT DETECTED
MUCOUS THREADS URNS QL MICRO: ABNORMAL /LPF
NEUTROPHILS # BLD AUTO: 16.53 X10(3)/MCL (ref 2.1–9.2)
NEUTROPHILS # BLD AUTO: 5.95 X10(3)/MCL (ref 2.1–9.2)
NEUTROPHILS # BLD AUTO: 8.61 X10(3)/MCL (ref 2.1–9.2)
NEUTROPHILS NFR BLD AUTO: 57.2 %
NEUTROPHILS NFR BLD AUTO: 80.8 %
NEUTROPHILS NFR BLD AUTO: 88.8 %
NITRITE UR QL STRIP.AUTO: NEGATIVE
NRBC BLD AUTO-RTO: 0 %
O2 HB BLOOD GAS (OHS): 91 % (ref 94–100)
O2 HB BLOOD GAS (OHS): 93.4 % (ref 94–100)
O2 HB BLOOD GAS (OHS): 97.5 % (ref 94–100)
O2 HB BLOOD GAS (OHS): 97.8 % (ref 94–100)
OHS QRS DURATION: 126 MS
OHS QRS DURATION: 156 MS
OHS QTC CALCULATION: 493 MS
OHS QTC CALCULATION: 577 MS
OPIATES UR QL SCN: NEGATIVE
OXYGEN DEVICE BLOOD GAS (OHS): ABNORMAL
OXYHGB MFR BLDA: 16.4 G/DL (ref 12–18)
OXYHGB MFR BLDA: 17.1 G/DL (ref 12–18)
OXYHGB MFR BLDA: 17.5 G/DL (ref 12–18)
OXYHGB MFR BLDA: 18 G/DL (ref 12–18)
PCO2 BLDA: 28 MMHG (ref 35–45)
PCO2 BLDA: 30 MMHG (ref 35–45)
PCO2 BLDA: 38 MMHG (ref 35–45)
PCO2 BLDA: 43 MMHG (ref 35–45)
PCP UR QL: NEGATIVE
PEEP RESPIRATORY: 10 CMH2O
PEEP RESPIRATORY: 5 CMH2O
PH BLDA: 7.29 [PH] (ref 7.35–7.45)
PH BLDA: 7.37 [PH] (ref 7.35–7.45)
PH BLDA: 7.37 [PH] (ref 7.35–7.45)
PH BLDA: 7.43 [PH] (ref 7.35–7.45)
PH UR STRIP.AUTO: 6.5 [PH]
PH UR: 6.5 [PH] (ref 3–11)
PHOSPHATE SERPL-MCNC: 10.7 MG/DL (ref 2.3–4.7)
PHOSPHATE SERPL-MCNC: 13.4 MG/DL (ref 2.3–4.7)
PHOSPHATE SERPL-MCNC: 2.6 MG/DL (ref 2.3–4.7)
PHOSPHATE SERPL-MCNC: 2.9 MG/DL (ref 2.3–4.7)
PHOSPHATE SERPL-MCNC: 5 MG/DL (ref 2.3–4.7)
PHOSPHATE SERPL-MCNC: 6.7 MG/DL (ref 2.3–4.7)
PHOSPHATE SERPL-MCNC: 7.6 MG/DL (ref 2.3–4.7)
PLATELET # BLD AUTO: 148 X10(3)/MCL (ref 130–400)
PLATELET # BLD AUTO: 166 X10(3)/MCL (ref 130–400)
PLATELET # BLD AUTO: 261 X10(3)/MCL (ref 130–400)
PMV BLD AUTO: 10.5 FL (ref 7.4–10.4)
PMV BLD AUTO: 9.1 FL (ref 7.4–10.4)
PMV BLD AUTO: 9.5 FL (ref 7.4–10.4)
PO2 BLDA: 165 MMHG (ref 75–100)
PO2 BLDA: 219 MMHG (ref 75–100)
PO2 BLDA: 70 MMHG (ref 75–100)
PO2 BLDA: ABNORMAL MM[HG]
POCT GLUCOSE: 106 MG/DL (ref 70–110)
POCT GLUCOSE: 108 MG/DL (ref 70–110)
POCT GLUCOSE: 116 MG/DL (ref 70–110)
POCT GLUCOSE: 119 MG/DL (ref 70–110)
POCT GLUCOSE: 124 MG/DL (ref 70–110)
POCT GLUCOSE: 133 MG/DL (ref 70–110)
POCT GLUCOSE: 133 MG/DL (ref 70–110)
POCT GLUCOSE: 153 MG/DL (ref 70–110)
POCT GLUCOSE: 155 MG/DL (ref 70–110)
POCT GLUCOSE: 165 MG/DL (ref 70–110)
POCT GLUCOSE: 168 MG/DL (ref 70–110)
POCT GLUCOSE: 175 MG/DL (ref 70–110)
POCT GLUCOSE: 177 MG/DL (ref 70–110)
POCT GLUCOSE: 189 MG/DL (ref 70–110)
POCT GLUCOSE: 192 MG/DL (ref 70–110)
POCT GLUCOSE: 217 MG/DL (ref 70–110)
POCT GLUCOSE: 225 MG/DL (ref 70–110)
POCT GLUCOSE: 28 MG/DL (ref 70–110)
POCT GLUCOSE: 36 MG/DL (ref 70–110)
POCT GLUCOSE: 51 MG/DL (ref 70–110)
POCT GLUCOSE: 65 MG/DL (ref 70–110)
POCT GLUCOSE: 68 MG/DL (ref 70–110)
POCT GLUCOSE: 83 MG/DL (ref 70–110)
POCT GLUCOSE: 93 MG/DL (ref 70–110)
POCT GLUCOSE: 97 MG/DL (ref 70–110)
POTASSIUM SERPL-SCNC: 3.4 MMOL/L (ref 3.5–5.1)
POTASSIUM SERPL-SCNC: 3.5 MMOL/L (ref 3.5–5.1)
POTASSIUM SERPL-SCNC: 4 MMOL/L (ref 3.5–5.1)
POTASSIUM SERPL-SCNC: 4.3 MMOL/L (ref 3.5–5.1)
POTASSIUM SERPL-SCNC: 4.4 MMOL/L (ref 3.5–5.1)
POTASSIUM SERPL-SCNC: 4.5 MMOL/L (ref 3.5–5.1)
POTASSIUM SERPL-SCNC: 4.7 MMOL/L (ref 3.5–5.1)
POTASSIUM SERPL-SCNC: 5.1 MMOL/L (ref 3.5–5.1)
POTASSIUM SERPL-SCNC: 6.7 MMOL/L (ref 3.5–5.1)
PROT SERPL-MCNC: 5.2 GM/DL (ref 6.4–8.3)
PROT SERPL-MCNC: 5.5 GM/DL (ref 6.4–8.3)
PROT SERPL-MCNC: 6.9 GM/DL (ref 6.4–8.3)
PROT SERPL-MCNC: 7.3 GM/DL (ref 6.4–8.3)
PROT UR QL STRIP.AUTO: ABNORMAL
PROTHROMBIN TIME: 17.2 SECONDS (ref 11.4–14)
PROTHROMBIN TIME: 17.2 SECONDS (ref 11.4–14)
PROTHROMBIN TIME: 20.1 SECONDS (ref 11.4–14)
RBC # BLD AUTO: 4.12 X10(6)/MCL (ref 4.7–6.1)
RBC # BLD AUTO: 5.18 X10(6)/MCL (ref 4.7–6.1)
RBC # BLD AUTO: 5.53 X10(6)/MCL (ref 4.7–6.1)
RBC #/AREA URNS AUTO: ABNORMAL /HPF
RBC UR QL AUTO: ABNORMAL
SAMPLE SITE BLOOD GAS (OHS): ABNORMAL
SAO2 % BLDA: 93.4 %
SAO2 % BLDA: 98.1 %
SAO2 % BLDA: 99.1 %
SAO2 % BLDA: 99.5 %
SINUS: 3.4 CM
SODIUM SERPL-SCNC: 133 MMOL/L (ref 136–145)
SODIUM SERPL-SCNC: 135 MMOL/L (ref 136–145)
SODIUM SERPL-SCNC: 136 MMOL/L (ref 136–145)
SODIUM SERPL-SCNC: 137 MMOL/L (ref 136–145)
SODIUM SERPL-SCNC: 137 MMOL/L (ref 136–145)
SODIUM SERPL-SCNC: 138 MMOL/L (ref 136–145)
SODIUM SERPL-SCNC: 138 MMOL/L (ref 136–145)
SODIUM SERPL-SCNC: 139 MMOL/L (ref 136–145)
SODIUM SERPL-SCNC: 140 MMOL/L (ref 136–145)
SP GR UR STRIP.AUTO: 1.02 (ref 1–1.03)
SPECIFIC GRAVITY, URINE AUTO (.000) (OHS): 1.02 (ref 1–1.03)
SPONT RR (OHS): 10 B/MIN
SPONT RR (OHS): 8 B/MIN
SPONT+MECH VT ON VENT: 500 ML
SQUAMOUS #/AREA URNS LPF: ABNORMAL /HPF
T PALLIDUM AB SER QL: NONREACTIVE
TROPONIN I SERPL-MCNC: 0.3 NG/ML (ref 0–0.04)
TROPONIN I SERPL-MCNC: 13.88 NG/ML (ref 0–0.04)
TROPONIN I SERPL-MCNC: 14.74 NG/ML (ref 0–0.04)
TROPONIN I SERPL-MCNC: 15.31 NG/ML (ref 0–0.04)
TROPONIN I SERPL-MCNC: 19.17 NG/ML (ref 0–0.04)
TSH SERPL-ACNC: 4.54 UIU/ML (ref 0.35–4.94)
UNIDENT CRYS #/AREA URNS HPF: ABNORMAL /HPF
UROBILINOGEN UR STRIP-ACNC: NORMAL
WBC # SPEC AUTO: 10.39 X10(3)/MCL (ref 4.5–11.5)
WBC # SPEC AUTO: 10.64 X10(3)/MCL (ref 4.5–11.5)
WBC # SPEC AUTO: 18.62 X10(3)/MCL (ref 4.5–11.5)
WBC #/AREA URNS AUTO: ABNORMAL /HPF
WBC CLUMPS UR QL AUTO: ABNORMAL
Z-SCORE OF LEFT VENTRICULAR DIMENSION IN END DIASTOLE: -3.44
Z-SCORE OF LEFT VENTRICULAR DIMENSION IN END SYSTOLE: -0.01

## 2024-01-01 PROCEDURE — 1159F MED LIST DOCD IN RCRD: CPT | Mod: CPTII,,, | Performed by: NURSE PRACTITIONER

## 2024-01-01 PROCEDURE — 27200966 HC CLOSED SUCTION SYSTEM

## 2024-01-01 PROCEDURE — 84484 ASSAY OF TROPONIN QUANT: CPT

## 2024-01-01 PROCEDURE — 63600175 PHARM REV CODE 636 W HCPCS: Performed by: STUDENT IN AN ORGANIZED HEALTH CARE EDUCATION/TRAINING PROGRAM

## 2024-01-01 PROCEDURE — 84443 ASSAY THYROID STIM HORMONE: CPT | Performed by: STUDENT IN AN ORGANIZED HEALTH CARE EDUCATION/TRAINING PROGRAM

## 2024-01-01 PROCEDURE — 80307 DRUG TEST PRSMV CHEM ANLYZR: CPT | Performed by: EMERGENCY MEDICINE

## 2024-01-01 PROCEDURE — 82077 ASSAY SPEC XCP UR&BREATH IA: CPT

## 2024-01-01 PROCEDURE — 96361 HYDRATE IV INFUSION ADD-ON: CPT

## 2024-01-01 PROCEDURE — 37799 UNLISTED PX VASCULAR SURGERY: CPT

## 2024-01-01 PROCEDURE — 02HV33Z INSERTION OF INFUSION DEVICE INTO SUPERIOR VENA CAVA, PERCUTANEOUS APPROACH: ICD-10-PCS | Performed by: INTERNAL MEDICINE

## 2024-01-01 PROCEDURE — 84484 ASSAY OF TROPONIN QUANT: CPT | Performed by: STUDENT IN AN ORGANIZED HEALTH CARE EDUCATION/TRAINING PROGRAM

## 2024-01-01 PROCEDURE — 96375 TX/PRO/DX INJ NEW DRUG ADDON: CPT

## 2024-01-01 PROCEDURE — C1751 CATH, INF, PER/CENT/MIDLINE: HCPCS

## 2024-01-01 PROCEDURE — 80074 ACUTE HEPATITIS PANEL: CPT | Performed by: STUDENT IN AN ORGANIZED HEALTH CARE EDUCATION/TRAINING PROGRAM

## 2024-01-01 PROCEDURE — 87641 MR-STAPH DNA AMP PROBE: CPT | Performed by: STUDENT IN AN ORGANIZED HEALTH CARE EDUCATION/TRAINING PROGRAM

## 2024-01-01 PROCEDURE — A4217 STERILE WATER/SALINE, 500 ML: HCPCS

## 2024-01-01 PROCEDURE — 99900035 HC TECH TIME PER 15 MIN (STAT)

## 2024-01-01 PROCEDURE — 36600 WITHDRAWAL OF ARTERIAL BLOOD: CPT

## 2024-01-01 PROCEDURE — 85610 PROTHROMBIN TIME: CPT | Performed by: STUDENT IN AN ORGANIZED HEALTH CARE EDUCATION/TRAINING PROGRAM

## 2024-01-01 PROCEDURE — 1160F RVW MEDS BY RX/DR IN RCRD: CPT | Mod: CPTII,,, | Performed by: INTERNAL MEDICINE

## 2024-01-01 PROCEDURE — 83605 ASSAY OF LACTIC ACID: CPT

## 2024-01-01 PROCEDURE — 99213 OFFICE O/P EST LOW 20 MIN: CPT | Mod: PBBFAC | Performed by: INTERNAL MEDICINE

## 2024-01-01 PROCEDURE — 82330 ASSAY OF CALCIUM: CPT

## 2024-01-01 PROCEDURE — 82803 BLOOD GASES ANY COMBINATION: CPT

## 2024-01-01 PROCEDURE — 4010F ACE/ARB THERAPY RXD/TAKEN: CPT | Mod: CPTII,,, | Performed by: INTERNAL MEDICINE

## 2024-01-01 PROCEDURE — 85610 PROTHROMBIN TIME: CPT

## 2024-01-01 PROCEDURE — 83735 ASSAY OF MAGNESIUM: CPT | Performed by: STUDENT IN AN ORGANIZED HEALTH CARE EDUCATION/TRAINING PROGRAM

## 2024-01-01 PROCEDURE — 87522 HEPATITIS C REVRS TRNSCRPJ: CPT | Performed by: STUDENT IN AN ORGANIZED HEALTH CARE EDUCATION/TRAINING PROGRAM

## 2024-01-01 PROCEDURE — 87389 HIV-1 AG W/HIV-1&-2 AB AG IA: CPT | Performed by: STUDENT IN AN ORGANIZED HEALTH CARE EDUCATION/TRAINING PROGRAM

## 2024-01-01 PROCEDURE — 63600175 PHARM REV CODE 636 W HCPCS: Performed by: EMERGENCY MEDICINE

## 2024-01-01 PROCEDURE — 25000003 PHARM REV CODE 250: Performed by: EMERGENCY MEDICINE

## 2024-01-01 PROCEDURE — 27000513 HC SENSOR FLOTRAC

## 2024-01-01 PROCEDURE — 71260 CT THORAX DX C+: CPT | Mod: TC

## 2024-01-01 PROCEDURE — 63600175 PHARM REV CODE 636 W HCPCS

## 2024-01-01 PROCEDURE — 36556 INSERT NON-TUNNEL CV CATH: CPT

## 2024-01-01 PROCEDURE — 80053 COMPREHEN METABOLIC PANEL: CPT

## 2024-01-01 PROCEDURE — 3080F DIAST BP >= 90 MM HG: CPT | Mod: CPTII,,, | Performed by: INTERNAL MEDICINE

## 2024-01-01 PROCEDURE — 83735 ASSAY OF MAGNESIUM: CPT | Performed by: EMERGENCY MEDICINE

## 2024-01-01 PROCEDURE — 1159F MED LIST DOCD IN RCRD: CPT | Mod: CPTII,,, | Performed by: INTERNAL MEDICINE

## 2024-01-01 PROCEDURE — 85730 THROMBOPLASTIN TIME PARTIAL: CPT | Performed by: STUDENT IN AN ORGANIZED HEALTH CARE EDUCATION/TRAINING PROGRAM

## 2024-01-01 PROCEDURE — A4216 STERILE WATER/SALINE, 10 ML: HCPCS | Performed by: INTERNAL MEDICINE

## 2024-01-01 PROCEDURE — 85025 COMPLETE CBC W/AUTO DIFF WBC: CPT | Performed by: EMERGENCY MEDICINE

## 2024-01-01 PROCEDURE — 83036 HEMOGLOBIN GLYCOSYLATED A1C: CPT | Performed by: STUDENT IN AN ORGANIZED HEALTH CARE EDUCATION/TRAINING PROGRAM

## 2024-01-01 PROCEDURE — 20000000 HC ICU ROOM

## 2024-01-01 PROCEDURE — 3075F SYST BP GE 130 - 139MM HG: CPT | Mod: CPTII,,, | Performed by: NURSE PRACTITIONER

## 2024-01-01 PROCEDURE — 3079F DIAST BP 80-89 MM HG: CPT | Mod: CPTII,,, | Performed by: NURSE PRACTITIONER

## 2024-01-01 PROCEDURE — 83880 ASSAY OF NATRIURETIC PEPTIDE: CPT

## 2024-01-01 PROCEDURE — 82947 ASSAY GLUCOSE BLOOD QUANT: CPT | Performed by: STUDENT IN AN ORGANIZED HEALTH CARE EDUCATION/TRAINING PROGRAM

## 2024-01-01 PROCEDURE — 25000003 PHARM REV CODE 250

## 2024-01-01 PROCEDURE — 03HY32Z INSERTION OF MONITORING DEVICE INTO UPPER ARTERY, PERCUTANEOUS APPROACH: ICD-10-PCS | Performed by: STUDENT IN AN ORGANIZED HEALTH CARE EDUCATION/TRAINING PROGRAM

## 2024-01-01 PROCEDURE — 84100 ASSAY OF PHOSPHORUS: CPT | Performed by: STUDENT IN AN ORGANIZED HEALTH CARE EDUCATION/TRAINING PROGRAM

## 2024-01-01 PROCEDURE — 5A1945Z RESPIRATORY VENTILATION, 24-96 CONSECUTIVE HOURS: ICD-10-PCS | Performed by: INTERNAL MEDICINE

## 2024-01-01 PROCEDURE — 94761 N-INVAS EAR/PLS OXIMETRY MLT: CPT

## 2024-01-01 PROCEDURE — 83520 IMMUNOASSAY QUANT NOS NONAB: CPT | Performed by: STUDENT IN AN ORGANIZED HEALTH CARE EDUCATION/TRAINING PROGRAM

## 2024-01-01 PROCEDURE — 81001 URINALYSIS AUTO W/SCOPE: CPT | Mod: XB | Performed by: EMERGENCY MEDICINE

## 2024-01-01 PROCEDURE — 80143 DRUG ASSAY ACETAMINOPHEN: CPT

## 2024-01-01 PROCEDURE — 25000003 PHARM REV CODE 250: Performed by: INTERNAL MEDICINE

## 2024-01-01 PROCEDURE — 80053 COMPREHEN METABOLIC PANEL: CPT | Performed by: EMERGENCY MEDICINE

## 2024-01-01 PROCEDURE — 87086 URINE CULTURE/COLONY COUNT: CPT | Performed by: EMERGENCY MEDICINE

## 2024-01-01 PROCEDURE — 3008F BODY MASS INDEX DOCD: CPT | Mod: CPTII,,, | Performed by: NURSE PRACTITIONER

## 2024-01-01 PROCEDURE — 87205 SMEAR GRAM STAIN: CPT | Performed by: STUDENT IN AN ORGANIZED HEALTH CARE EDUCATION/TRAINING PROGRAM

## 2024-01-01 PROCEDURE — 82553 CREATINE MB FRACTION: CPT

## 2024-01-01 PROCEDURE — 85025 COMPLETE CBC W/AUTO DIFF WBC: CPT

## 2024-01-01 PROCEDURE — 90471 IMMUNIZATION ADMIN: CPT | Mod: PBBFAC

## 2024-01-01 PROCEDURE — 84100 ASSAY OF PHOSPHORUS: CPT

## 2024-01-01 PROCEDURE — 87077 CULTURE AEROBIC IDENTIFY: CPT | Performed by: STUDENT IN AN ORGANIZED HEALTH CARE EDUCATION/TRAINING PROGRAM

## 2024-01-01 PROCEDURE — 82550 ASSAY OF CK (CPK): CPT

## 2024-01-01 PROCEDURE — 90636 HEP A/HEP B VACC ADULT IM: CPT | Mod: PBBFAC

## 2024-01-01 PROCEDURE — 85730 THROMBOPLASTIN TIME PARTIAL: CPT

## 2024-01-01 PROCEDURE — 63600175 PHARM REV CODE 636 W HCPCS: Performed by: INTERNAL MEDICINE

## 2024-01-01 PROCEDURE — 31500 INSERT EMERGENCY AIRWAY: CPT

## 2024-01-01 PROCEDURE — 86780 TREPONEMA PALLIDUM: CPT | Performed by: STUDENT IN AN ORGANIZED HEALTH CARE EDUCATION/TRAINING PROGRAM

## 2024-01-01 PROCEDURE — 99214 OFFICE O/P EST MOD 30 MIN: CPT | Mod: PBBFAC | Performed by: STUDENT IN AN ORGANIZED HEALTH CARE EDUCATION/TRAINING PROGRAM

## 2024-01-01 PROCEDURE — 93005 ELECTROCARDIOGRAM TRACING: CPT

## 2024-01-01 PROCEDURE — 3077F SYST BP >= 140 MM HG: CPT | Mod: CPTII,,, | Performed by: INTERNAL MEDICINE

## 2024-01-01 PROCEDURE — 0BH17EZ INSERTION OF ENDOTRACHEAL AIRWAY INTO TRACHEA, VIA NATURAL OR ARTIFICIAL OPENING: ICD-10-PCS | Performed by: INTERNAL MEDICINE

## 2024-01-01 PROCEDURE — 27000221 HC OXYGEN, UP TO 24 HOURS

## 2024-01-01 PROCEDURE — 99900026 HC AIRWAY MAINTENANCE (STAT)

## 2024-01-01 PROCEDURE — 25000003 PHARM REV CODE 250: Performed by: STUDENT IN AN ORGANIZED HEALTH CARE EDUCATION/TRAINING PROGRAM

## 2024-01-01 PROCEDURE — 99214 OFFICE O/P EST MOD 30 MIN: CPT | Mod: S$PBB,,, | Performed by: INTERNAL MEDICINE

## 2024-01-01 PROCEDURE — 25500020 PHARM REV CODE 255

## 2024-01-01 PROCEDURE — 99214 OFFICE O/P EST MOD 30 MIN: CPT | Mod: S$PBB,,, | Performed by: NURSE PRACTITIONER

## 2024-01-01 PROCEDURE — 51702 INSERT TEMP BLADDER CATH: CPT

## 2024-01-01 PROCEDURE — 87040 BLOOD CULTURE FOR BACTERIA: CPT | Performed by: STUDENT IN AN ORGANIZED HEALTH CARE EDUCATION/TRAINING PROGRAM

## 2024-01-01 PROCEDURE — 3008F BODY MASS INDEX DOCD: CPT | Mod: CPTII,,, | Performed by: INTERNAL MEDICINE

## 2024-01-01 PROCEDURE — 74178 CT ABD&PLV WO CNTR FLWD CNTR: CPT | Mod: TC

## 2024-01-01 PROCEDURE — 1160F RVW MEDS BY RX/DR IN RCRD: CPT | Mod: CPTII,,, | Performed by: NURSE PRACTITIONER

## 2024-01-01 PROCEDURE — 25500020 PHARM REV CODE 255: Performed by: INTERNAL MEDICINE

## 2024-01-01 PROCEDURE — 99223 1ST HOSP IP/OBS HIGH 75: CPT | Mod: ,,, | Performed by: INTERNAL MEDICINE

## 2024-01-01 PROCEDURE — 94003 VENT MGMT INPAT SUBQ DAY: CPT

## 2024-01-01 PROCEDURE — 96374 THER/PROPH/DIAG INJ IV PUSH: CPT

## 2024-01-01 PROCEDURE — 36415 COLL VENOUS BLD VENIPUNCTURE: CPT | Performed by: NURSE PRACTITIONER

## 2024-01-01 PROCEDURE — 99291 CRITICAL CARE FIRST HOUR: CPT

## 2024-01-01 PROCEDURE — 94761 N-INVAS EAR/PLS OXIMETRY MLT: CPT | Mod: XB

## 2024-01-01 PROCEDURE — 4010F ACE/ARB THERAPY RXD/TAKEN: CPT | Mod: CPTII,,, | Performed by: NURSE PRACTITIONER

## 2024-01-01 PROCEDURE — 25000003 PHARM REV CODE 250: Mod: JZ,JG

## 2024-01-01 PROCEDURE — 99214 OFFICE O/P EST MOD 30 MIN: CPT | Mod: PBBFAC,25 | Performed by: NURSE PRACTITIONER

## 2024-01-01 PROCEDURE — 36620 INSERTION CATHETER ARTERY: CPT

## 2024-01-01 PROCEDURE — 82962 GLUCOSE BLOOD TEST: CPT

## 2024-01-01 PROCEDURE — 87522 HEPATITIS C REVRS TRNSCRPJ: CPT | Performed by: NURSE PRACTITIONER

## 2024-01-01 RX ORDER — LABETALOL HCL 20 MG/4 ML
10 SYRINGE (ML) INTRAVENOUS EVERY 4 HOURS PRN
Status: DISCONTINUED | OUTPATIENT
Start: 2024-01-01 | End: 2024-01-01 | Stop reason: HOSPADM

## 2024-01-01 RX ORDER — METOPROLOL SUCCINATE 25 MG/1
25 TABLET, EXTENDED RELEASE ORAL DAILY
Qty: 30 TABLET | Refills: 11 | Status: SHIPPED | OUTPATIENT
Start: 2024-01-01 | End: 2025-01-02

## 2024-01-01 RX ORDER — LORAZEPAM 2 MG/ML
2 INJECTION INTRAMUSCULAR
Status: DISCONTINUED | OUTPATIENT
Start: 2024-01-01 | End: 2024-01-01 | Stop reason: HOSPADM

## 2024-01-01 RX ORDER — ACETAMINOPHEN 325 MG/1
650 TABLET ORAL EVERY 4 HOURS PRN
Status: DISCONTINUED | OUTPATIENT
Start: 2024-01-01 | End: 2024-01-01 | Stop reason: HOSPADM

## 2024-01-01 RX ORDER — NOREPINEPHRINE BITARTRATE/D5W 4MG/250ML
0-3 PLASTIC BAG, INJECTION (ML) INTRAVENOUS CONTINUOUS
Status: DISCONTINUED | OUTPATIENT
Start: 2024-01-01 | End: 2024-01-01

## 2024-01-01 RX ORDER — EPINEPHRINE 0.1 MG/ML
INJECTION INTRAVENOUS CODE/TRAUMA/SEDATION MEDICATION
Status: COMPLETED | OUTPATIENT
Start: 2024-01-01 | End: 2024-01-01

## 2024-01-01 RX ORDER — SODIUM CHLORIDE, SODIUM LACTATE, POTASSIUM CHLORIDE, CALCIUM CHLORIDE 600; 310; 30; 20 MG/100ML; MG/100ML; MG/100ML; MG/100ML
INJECTION, SOLUTION INTRAVENOUS CONTINUOUS
Status: DISCONTINUED | OUTPATIENT
Start: 2024-01-01 | End: 2024-01-01

## 2024-01-01 RX ORDER — FUROSEMIDE 20 MG/1
40 TABLET ORAL DAILY
Qty: 30 TABLET | Refills: 2 | Status: SHIPPED | OUTPATIENT
Start: 2024-01-01

## 2024-01-01 RX ORDER — SODIUM CHLORIDE 0.9 % (FLUSH) 0.9 %
10 SYRINGE (ML) INJECTION EVERY 6 HOURS
Status: DISCONTINUED | OUTPATIENT
Start: 2024-01-01 | End: 2024-01-01 | Stop reason: HOSPADM

## 2024-01-01 RX ORDER — CALCIUM GLUCONATE 20 MG/ML
1 INJECTION, SOLUTION INTRAVENOUS ONCE
Status: COMPLETED | OUTPATIENT
Start: 2024-01-01 | End: 2024-01-01

## 2024-01-01 RX ORDER — PROPOFOL 10 MG/ML
INJECTION, EMULSION INTRAVENOUS
Status: DISPENSED
Start: 2024-01-01 | End: 2024-01-01

## 2024-01-01 RX ORDER — SACUBITRIL AND VALSARTAN 24; 26 MG/1; MG/1
1 TABLET, FILM COATED ORAL 2 TIMES DAILY
COMMUNITY
Start: 2023-01-01 | End: 2024-01-01 | Stop reason: SDUPTHER

## 2024-01-01 RX ORDER — SODIUM CHLORIDE 0.9 % (FLUSH) 0.9 %
10 SYRINGE (ML) INJECTION
Status: DISCONTINUED | OUTPATIENT
Start: 2024-01-01 | End: 2024-01-01 | Stop reason: HOSPADM

## 2024-01-01 RX ORDER — MUPIROCIN 20 MG/G
OINTMENT TOPICAL 2 TIMES DAILY
Status: DISCONTINUED | OUTPATIENT
Start: 2024-01-01 | End: 2024-01-01 | Stop reason: HOSPADM

## 2024-01-01 RX ORDER — SACUBITRIL AND VALSARTAN 24; 26 MG/1; MG/1
1 TABLET, FILM COATED ORAL 2 TIMES DAILY
Qty: 90 TABLET | Refills: 3 | Status: SHIPPED | OUTPATIENT
Start: 2024-01-01

## 2024-01-01 RX ORDER — SODIUM BICARBONATE 1 MEQ/ML
SYRINGE (ML) INTRAVENOUS CODE/TRAUMA/SEDATION MEDICATION
Status: COMPLETED | OUTPATIENT
Start: 2024-01-01 | End: 2024-01-01

## 2024-01-01 RX ORDER — LEVETIRACETAM 10 MG/ML
1000 INJECTION INTRAVASCULAR EVERY 12 HOURS
Status: DISCONTINUED | OUTPATIENT
Start: 2024-01-01 | End: 2024-01-01 | Stop reason: HOSPADM

## 2024-01-01 RX ORDER — HYDRALAZINE HYDROCHLORIDE 20 MG/ML
10 INJECTION INTRAMUSCULAR; INTRAVENOUS EVERY 6 HOURS PRN
Status: DISCONTINUED | OUTPATIENT
Start: 2024-01-01 | End: 2024-01-01 | Stop reason: HOSPADM

## 2024-01-01 RX ORDER — ENOXAPARIN SODIUM 100 MG/ML
40 INJECTION SUBCUTANEOUS EVERY 24 HOURS
Status: DISCONTINUED | OUTPATIENT
Start: 2024-01-01 | End: 2024-01-01

## 2024-01-01 RX ORDER — LORAZEPAM 2 MG/ML
2 INJECTION INTRAMUSCULAR
Status: DISCONTINUED | OUTPATIENT
Start: 2024-01-01 | End: 2024-01-01

## 2024-01-01 RX ORDER — ALBUTEROL SULFATE 90 UG/1
AEROSOL, METERED RESPIRATORY (INHALATION)
COMMUNITY
Start: 2024-01-01

## 2024-01-01 RX ORDER — DEXTROSE MONOHYDRATE 100 MG/ML
INJECTION, SOLUTION INTRAVENOUS CONTINUOUS
Status: DISCONTINUED | OUTPATIENT
Start: 2024-01-01 | End: 2024-01-01 | Stop reason: HOSPADM

## 2024-01-01 RX ORDER — PROPOFOL 10 MG/ML
0-50 INJECTION, EMULSION INTRAVENOUS CONTINUOUS
Status: DISCONTINUED | OUTPATIENT
Start: 2024-01-01 | End: 2024-01-01 | Stop reason: HOSPADM

## 2024-01-01 RX ADMIN — DOXYCYCLINE 100 MG: 100 INJECTION, POWDER, LYOPHILIZED, FOR SOLUTION INTRAVENOUS at 02:03

## 2024-01-01 RX ADMIN — LABETALOL HYDROCHLORIDE 10 MG: 5 INJECTION, SOLUTION INTRAVENOUS at 09:03

## 2024-01-01 RX ADMIN — DEXTROSE MONOHYDRATE 250 ML: 100 INJECTION, SOLUTION INTRAVENOUS at 01:03

## 2024-01-01 RX ADMIN — EPINEPHRINE 1 MG: 0.1 INJECTION, SOLUTION INTRAVENOUS at 04:03

## 2024-01-01 RX ADMIN — LEVETIRACETAM INJECTION 1000 MG: 10 INJECTION INTRAVENOUS at 09:03

## 2024-01-01 RX ADMIN — HEPATITIS A AND HEPATITIS B (RECOMBINANT) VACCINE 720 UNITS: 720; 20 INJECTION, SUSPENSION INTRAMUSCULAR at 02:02

## 2024-01-01 RX ADMIN — IOHEXOL 100 ML: 350 INJECTION, SOLUTION INTRAVENOUS at 06:03

## 2024-01-01 RX ADMIN — NOREPINEPHRINE BITARTRATE 0.34 MCG/KG/MIN: 1 INJECTION, SOLUTION, CONCENTRATE INTRAVENOUS at 02:03

## 2024-01-01 RX ADMIN — NOREPINEPHRINE BITARTRATE 0.34 MCG/KG/MIN: 4 INJECTION, SOLUTION INTRAVENOUS at 10:03

## 2024-01-01 RX ADMIN — MUPIROCIN: 20 OINTMENT TOPICAL at 09:03

## 2024-01-01 RX ADMIN — NOREPINEPHRINE BITARTRATE 0.3 MCG/KG/MIN: 4 INJECTION, SOLUTION INTRAVENOUS at 06:03

## 2024-01-01 RX ADMIN — PROPOFOL 10 MCG/KG/MIN: 10 INJECTION, EMULSION INTRAVENOUS at 12:03

## 2024-01-01 RX ADMIN — PROPOFOL 35 MCG/KG/MIN: 10 INJECTION, EMULSION INTRAVENOUS at 07:03

## 2024-01-01 RX ADMIN — HUMAN INSULIN 10 UNITS: 100 INJECTION, SOLUTION SUBCUTANEOUS at 11:03

## 2024-01-01 RX ADMIN — NOREPINEPHRINE BITARTRATE 0.24 MCG/KG/MIN: 4 INJECTION, SOLUTION INTRAVENOUS at 03:03

## 2024-01-01 RX ADMIN — DEXTROSE MONOHYDRATE: 100 INJECTION, SOLUTION INTRAVENOUS at 02:03

## 2024-01-01 RX ADMIN — LEVETIRACETAM INJECTION 1000 MG: 10 INJECTION INTRAVENOUS at 10:03

## 2024-01-01 RX ADMIN — HYDRALAZINE HYDROCHLORIDE 10 MG: 20 INJECTION INTRAMUSCULAR; INTRAVENOUS at 12:03

## 2024-01-01 RX ADMIN — PROPOFOL 45 MCG/KG/MIN: 10 INJECTION, EMULSION INTRAVENOUS at 12:03

## 2024-01-01 RX ADMIN — SODIUM CHLORIDE, POTASSIUM CHLORIDE, SODIUM LACTATE AND CALCIUM CHLORIDE 1000 ML: 600; 310; 30; 20 INJECTION, SOLUTION INTRAVENOUS at 05:03

## 2024-01-01 RX ADMIN — DEXTROSE MONOHYDRATE 250 ML: 100 INJECTION, SOLUTION INTRAVENOUS at 11:03

## 2024-01-01 RX ADMIN — SODIUM BICARBONATE: 84 INJECTION, SOLUTION INTRAVENOUS at 12:03

## 2024-01-01 RX ADMIN — SODIUM BICARBONATE: 84 INJECTION, SOLUTION INTRAVENOUS at 03:03

## 2024-01-01 RX ADMIN — CALCIUM GLUCONATE 1 G: 20 INJECTION, SOLUTION INTRAVENOUS at 10:03

## 2024-01-01 RX ADMIN — SODIUM BICARBONATE: 84 INJECTION, SOLUTION INTRAVENOUS at 10:03

## 2024-01-01 RX ADMIN — MUPIROCIN: 20 OINTMENT TOPICAL at 08:03

## 2024-01-01 RX ADMIN — NOREPINEPHRINE BITARTRATE 0.02 MCG/KG/MIN: 4 INJECTION, SOLUTION INTRAVENOUS at 02:03

## 2024-01-01 RX ADMIN — PROPOFOL 35 MCG/KG/MIN: 10 INJECTION, EMULSION INTRAVENOUS at 05:03

## 2024-01-01 RX ADMIN — SODIUM BICARBONATE 50 MEQ: 84 INJECTION INTRAVENOUS at 04:03

## 2024-01-01 RX ADMIN — PROPOFOL 35 MCG/KG/MIN: 10 INJECTION, EMULSION INTRAVENOUS at 10:03

## 2024-01-01 RX ADMIN — IOHEXOL 100 ML: 350 INJECTION, SOLUTION INTRAVENOUS at 03:01

## 2024-01-01 RX ADMIN — NOREPINEPHRINE BITARTRATE 0.94 MCG/KG/MIN: 1 INJECTION, SOLUTION, CONCENTRATE INTRAVENOUS at 02:03

## 2024-01-01 RX ADMIN — DEXTROSE MONOHYDRATE 125 ML: 100 INJECTION, SOLUTION INTRAVENOUS at 09:03

## 2024-01-01 RX ADMIN — DOXYCYCLINE 100 MG: 100 INJECTION, POWDER, LYOPHILIZED, FOR SOLUTION INTRAVENOUS at 01:03

## 2024-01-01 RX ADMIN — SODIUM CHLORIDE, POTASSIUM CHLORIDE, SODIUM LACTATE AND CALCIUM CHLORIDE: 600; 310; 30; 20 INJECTION, SOLUTION INTRAVENOUS at 10:03

## 2024-01-01 RX ADMIN — SODIUM BICARBONATE: 84 INJECTION, SOLUTION INTRAVENOUS at 02:03

## 2024-01-01 RX ADMIN — WATER: 1000 INJECTION, SOLUTION INTRAVENOUS at 08:03

## 2024-01-01 RX ADMIN — LEVETIRACETAM INJECTION 1000 MG: 10 INJECTION INTRAVENOUS at 08:03

## 2024-01-01 RX ADMIN — PROPOFOL 30 MCG/KG/MIN: 10 INJECTION, EMULSION INTRAVENOUS at 05:03

## 2024-01-01 RX ADMIN — LORAZEPAM 2 MG: 2 INJECTION INTRAMUSCULAR; INTRAVENOUS at 12:03

## 2024-01-01 RX ADMIN — Medication 10 ML: at 12:03

## 2024-01-01 RX ADMIN — LORAZEPAM 2 MG: 2 INJECTION INTRAMUSCULAR; INTRAVENOUS at 10:03
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
## 2024-01-10 PROBLEM — Z08 ENCOUNTER FOR FOLLOW-UP SURVEILLANCE OF NEUROENDOCRINE CARCINOMA: Status: ACTIVE | Noted: 2024-01-10

## 2024-01-10 PROBLEM — Z85.89 ENCOUNTER FOR FOLLOW-UP SURVEILLANCE OF NEUROENDOCRINE CARCINOMA: Status: ACTIVE | Noted: 2024-01-10

## 2024-01-27 PROBLEM — Z86.19 HISTORY OF HEPATITIS C: Status: ACTIVE | Noted: 2024-01-27

## 2024-01-27 NOTE — PROGRESS NOTES
History:  Past Medical History:   Diagnosis Date    Essential (primary) hypertension     Hepatitis a without hepatic coma    -history of hernia repair x4; hepatitis-A, treated with Epclusa; history of heart failure with EF 40-45%, pulmonary artery hypertension, amphetamine abuse    Social history:  Single.  Lives in [place], [place].  Has 3 children.  Not working for last 1 year; previously, used to work in construction.  Has been smoking < a pack of cigarettes daily for 10-15 years.  Used to drink 24 beers daily; drank for 7-8 years, quit 20 years ago.  No illicit drugs.    Family history:  Maternal grandmother experience breast cancer in her 40s.  Health maintenance:  Has no PCP.  Amenable to be referred to Internal Medicine, [facility], to establish with a PCP.  Past Surgical History:   Procedure Laterality Date    COLON RESECTION  12/22/2022    INGUINAL HERNIA REPAIR        Social History     Socioeconomic History    Marital status: Single   Tobacco Use    Smoking status: Every Day     Current packs/day: 0.50     Average packs/day: 0.5 packs/day for 39.1 years (19.5 ttl pk-yrs)     Types: Cigarettes     Start date: 1985    Smokeless tobacco: Never   Substance and Sexual Activity    Alcohol use: Not Currently     Comment: no drinking x 20 years    Drug use: Yes     Types: Methamphetamines     Comment: last use 1/1/2024    Sexual activity: Yes     Partners: Female     Birth control/protection: None     Social Determinants of Health     Financial Resource Strain: Medium Risk (1/3/2024)    Overall Financial Resource Strain (CARDIA)     Difficulty of Paying Living Expenses: Somewhat hard   Food Insecurity: Food Insecurity Present (1/3/2024)    Hunger Vital Sign     Worried About Running Out of Food in the Last Year: Sometimes true     Ran Out of Food in the Last Year: Sometimes true   Transportation Needs: No Transportation Needs (1/3/2024)    PRAPARE - Transportation     Lack of Transportation (Medical): No     Lack  of Transportation (Non-Medical): No   Physical Activity: Sufficiently Active (1/3/2024)    Exercise Vital Sign     Days of Exercise per Week: 7 days     Minutes of Exercise per Session: 30 min   Stress: No Stress Concern Present (1/3/2024)    [employer] [place] [employer] - Occupational Stress Questionnaire     Feeling of Stress : Not at all   Social Connections: Socially Isolated (1/3/2024)    Social Connection and Isolation Panel [NHANES]     Frequency of Communication with Friends and Family: Never     Frequency of Social Gatherings with Friends and Family: Never     Attends [employer] Services: Never     Active Member of Clubs or Organizations: No     Attends Club or Organization Meetings: Never     Marital Status: Never    Housing Stability: Low Risk  (1/3/2024)    Housing Stability Vital Sign     Unable to Pay for Housing in the Last Year: No     Number of Places Lived in the Last Year: 2     Unstable Housing in the Last Year: No      History reviewed. No pertinent family history.     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 Toribio Gómez, [facility], with colon cancer.    Presented to the emergency department with complaints of some right groin pain.  On workup, found to have a large malignant appearing  cecal mass.  Colonoscopy confirmed a large mass.  Mass was causing symptoms in the form of pain and neuralgia.  Has a 7 mm nodule in liver which will need further workup on repeat imaging versus biopsy.    10/18/2022:  CT chest with IV contrast (staging evaluation):  -no evidence of metastatic disease in chest   -small sliding hiatal hernia with wall thickening of distal esophagus which may    12/15/2022: CT A/P with IV contrast only (left lower quadrant abdominal pain):  -moderate to severe masslike thickening seen of cecum, can not rule out underlying neoplastic mass  -7 mm hypodense lesion right hepatic lobe, too small to fully characterize; triphasic CT or MRI could be considered to exclude underlying metastasis    12/17/2022: Colonoscopy (Dr. Jericho Salazar MD) (cecal mass on CT scan):  -cecum:  Large ulcerated mass encompassing 50% of circumference of the lumen on the opposite wall of the IC valve  -ascending colon, transverse colon, descending colon normal; sigmoid colon normal; rectum normal    12/18/2022:  Laparoscopic converted to open right colectomy:   -right lower quadrant showed large malignant-appearing mass arising through the colon and apparently growing into the anterior abdominal wall, requiring transition to open incision (open right colectomy)    Pathology:  -right:, partial colectomy; high-grade colonic neuroendocrine tumor; tumor site, right colon; 8.3 x 7.3 x 6.2 cm; no macroscopic tumor perforation; tumor extends through the muscularis propria; proximal and distal margins free (distance from closest margin 2.1 cm from the mesenteric/radial margin); no lymphovascular invasion; no perineural invasion; no tumor deposits; 1/24 mesenteric lymph nodes + for metastasis  >> pT3 pN1 pG3 pR0    No DNA mismatch repair abnormality noted (MMR proficient).  Low probability of microsatellite instability-high     Comment:  The specimen had an infection of a large bulky mass of colonic adenocarcinoma.   Possibilities include this to be simply a routine colonic adenocarcinoma or a neuroendocrine tumor.  Immunostains were performed.  This represents a high-grade colonic neuroendocrine tumor.  Well-differentiated neuroendocrine tumor, G3 (NET, G3)    -02/02/2023: CT A/P with IV contrast pelvic comparison:  12/15/2022):  -postsurgical changes from cecal mass resection  -no evidence of metastatic disease    02/06/2023:   Presents for initial medical oncology visit.  Accompanied by his sister and daughter.  Pleasant gentleman. , in no acute discomfort.    Overall, feels well.  Recovering from surgery.  Occasional constipation and diarrhea.  No blood in stool.  No abdominal pain.  No fevers or chills.  Some generalized weakness and fatigue.  Appetite is fluctuating.  ECOG 1.  No chest pain, cough, or dyspnea.  No flushing or wheezing.  No dizzy spells.        Interval History:  [No matching plan found]   [No matching plan found]   01/29/2024:    -history of hernia repair x4; hepatitis-C, treated with Epclusa; history of heart failure with EF 40-45%, pulmonary artery hypertension, amphetamine abuse  -01/05/2024:  Surveillance multiphasic CT scans of A/P with and without contrast:  [facility]  -01/05/2024: Surveillance CT chest with contrast:  [facility]  -no showed 01/11/2024  Presents for follow-up visit.  In no acute discomfort.  Feels great.  Great appetite.  Mushy stools.  No abdominal pain, nausea, vomiting.  No blood in stool.  ECOG 0.  No chest pain, cough, or dyspnea.    Medications:  Current Outpatient Medications on File Prior to Visit   Medication Sig Dispense Refill    ENTRESTO 24-26 mg per tablet Take 1 tablet by mouth 2 (two) times daily. 90 tablet 3    furosemide (LASIX) 20 MG tablet Take 2 tablets (40 mg total) by mouth once daily. 30 tablet 2    hyoscyamine (LEVSIN/SL) 0.125 mg Subl Place 0.125 mg under the tongue every 4 (four) hours as needed.      metoprolol succinate (TOPROL-XL) 25 MG 24 hr tablet Take 1 tablet (25  "mg total) by mouth once daily. 30 tablet 11    ondansetron (ZOFRAN-ODT) 4 MG TbDL Take 4 mg by mouth every 8 (eight) hours as needed.      SYMBICORT 160-4.5 mcg/actuation HFAA Inhale into the lungs.       No current facility-administered medications on file prior to visit.       Review of Systems:   All systems reviewed and found to be negative except for the symptoms detailed above    Physical Examination:   VITAL SIGNS:   Vitals:    01/29/24 1419   BP: (!) 155/93   Pulse: 99   Resp: 17   Temp: 98.1 °F (36.7 °C)       GENERAL:  In no apparent distress.    HEAD:  No signs of head trauma.  EYES:  Pupils are equal.  Extraocular motions intact.    EARS:  Hearing grossly intact.  MOUTH:  Oropharynx is normal.   NECK:  No adenopathy, no JVD.     CHEST:  Chest with clear breath sounds bilaterally.  No wheezes, rales, rhonchi.    CARDIAC:  Regular rate and rhythm.  S1 and S2, without murmurs, gallops, rubs.  VASCULAR:  No Edema.  Peripheral pulses normal and equal in all extremities.  ABDOMEN:  Soft, without detectable tenderness.  No sign of distention.  No   rebound or guarding, and no masses palpated.   Bowel Sounds normal.  MUSCULOSKELETAL:  Good range of motion of all major joints. Extremities without clubbing, cyanosis or edema.    NEUROLOGIC EXAM:  Alert and oriented x 3.  No focal sensory or strength deficits.   Speech normal.  Follows commands.  PSYCHIATRIC:  Mood normal.  12/11/2023: Excruciating mid abdominal pain; no peritoneal sign; voluntary guarding; no distention; we are going to send the patient to ER immediately for evaluation.    No results for input(s): "CBC" in the last 72 hours.   No results for input(s): "CMP" in the last 72 hours.     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    Primary malignant neuroendocrine tumor of cecum       GI    Liver lesion, right lobe    History " of hepatitis C       Well-differentiated, high-grade (NET, G3) neuroendocrine carcinoma of right colon/cecum:   -7 mm hypodense lesion right hepatic lobe on CT A/P with IV contrast 12/15/2022 (not visualized on subsequent CT A/P with contrast 02/14/2023)  -no metastases on CT chest 10/18/2022 and CT A/P 12/15/2022 (except for 7 mm lesion right hepatic lobe)  -S/P colonoscopy 12/17/2022:  Large ulcerated mass encompassing 50% of circumference of the lumen on the opposite side of the IC valve  -S/P right partial colectomy 12/18/2022  -tumor 8.3 x 7.3 x 6.2 cm; pT3, etc.  -1/24 mesenteric lymph nodes positive for metastasis  -pT3 pN1 M0 pG3 pR0, AJCC prognostic stage IIIB  -MMR proficient     -no metastases on staging CTs C/A/P with contrast 02/14/2023   -7 mm hypodense lesion right hepatic lobe noted on CT A/P with IV contrast 12/15/2022, not visualized on CTs A/P with contrast 02/14/2023  -multiple scattered small lung nodules on CT chest 02/14/2023, 3-4.5 mm  -medicaid denied our request for SSTR-PET/CT (68 gallium DOTATATE; or 64Cu Dotatate; or 68 gallium  -02/17/2023:  24 hour urine 5-HIAA level normal  -05/04/2023: [facility]:  Right lower quadrant abdominal pain radiating into groin; underwent scrotal exploration, excision of testicular cysts on 05/05/2023; no malignancy; no testicular torsion or ultrasound  -no recurrence or disease progression on surveillance CTs C/A/P 05/22/2023  -CT A/P 06/14/2023: Abdominal pain:  Negative  -surveillance multiphasic CTs of abdomen pelvis and CT chest with contrast 01/05/2024:  [facility]      Hepatitis-C:  -diagnosed 06/2023  -07/06/2023:  Hepatitis-A antibody IgG negative; hepatitis-B core antibody total negative; hepatitis-B surface antibody negative; hepatitis-B surface antigen negative; HIV negative; syphilis antibody negative   -07/06/2023:  No activity on ActiTest (no necroinflammatory activity)  -07/06/2023:  No fibrosis on FibroTest (no liver  fibrosis)  -07/06/2020:  HCV RNA 36753 IU/mL; genotype 2   (was diagnosed with hepatitis-C 06/2023; treatment naive; 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  ---------------------------      12/11/2023:  Severe abdominal pain; sent the patient to ER for evaluation; apparently, he walked out without being evaluated    Locoregional disease; did not need any adjuvant therapy    Need surveillance:    Every 3-6 months for 2 years (12/2022-12/2024), then   every 6-12 months for 10 years (12/2024-12/2034) with   history and physical; abdominal/pelvic MRI with contrast or abdominal/pelvic multiphasic CT; CT chest as clinically indicated  >>>  -surveillance CTs C/A/P 05/22/2023:  [facility]  -CT abdomen pelvis 06/14/2023:  [facility]  -surveillance multiphasic CTs of abdomen pelvis and CT chest with contrast 01/05/2024:  [facility]  >>>  For surveillance, repeat multiphasic contrast-enhanced CT scans of A/P, and contrast-enhanced chest CT in 6 months (July)  24 hour urine for 5-HIAA in 6 months (November) (overdue)    Hepatitis C, treated with Epclusa  Follow-up with ID    24 hour urine for 5-HIAA measurement now (overdue)   Follow-up in July with CBC, CMP, and scans.    Above discussed with him.  All questions answered.    Discussed labs and scans and gave him copies of relevant records.    Plan of surveillance discussed.    He understands and agrees with this plan.    Follow-up:  No follow-ups on file.

## 2024-01-29 NOTE — Clinical Note
For surveillance, repeat multiphasic contrast-enhanced CT scans of A/P, and contrast-enhanced CT chest in July  24 hour urine for 5-HIAA measurement now (overdue)  Follow-up in July with CBC, CMP, and scans.

## 2024-02-16 NOTE — PROGRESS NOTES
Patient ID: Sammy Palmer 58 y.o.     Chief Complaint:   Chief Complaint   Patient presents with    Followup Hep C     Denies problems        HPI:    2/16/24  Toño is a 59 yo WM presenting today for HCV f/u visit.  He took 12 weeks of Epclusa 8/14//23 as prescribed. He tolerated it well and did not have any side effects.  HCV RNA not detected at end of treatment on 11/10/23.  He is due for final dose of Twinrix vaccination, amenable to same today. He denies any blood exposures since treatment. Has changed out toothbrush, razor, clippers. He admits to smoking meth, last use 2/15/24. States that he has cut back from using 4-5 times per day to once a day. Will consider quitting.  BP elevated upon arrival, recheck /88.  Took morning medications & has evening doses for BP as well. Referred to [facility] cardiology by PCP for CHF, awaiting appointment. Will check HCV RNA today for SVR 12 and call pt with results. All questions answered & concerns addressed.    11/13/23  Mr. Christianson is a 59 yo WM here today for HCV f/u visit.  He completed 12 weeks of Epclusa on 11/6/23 as prescribed.  Tolerated well without any noted side effects. [place] [address] 7/2023, HCV RNA not detected 11/10/23.  He did miss 2nd dose of Twinrix vaccination series, will receive catch-up dose today. Amenable to flu vaccine as well.  He has cut back on cigarettes from 2 ppd to 12 cig/day. Encouraged cessation.  Continues to follow with oncology. He has changed out toothbrush & razors since starting treatment. He has no identifiable risk factors for HCV re-exposure at this juncture. All questions answered & concerns addressed.     7/18/23  Mr. Christianson is a 59 yo WM referred to [facility] by Dr. Priest for evaluation & treatment of Hepatitis C.  He is accompanied today by his sister Chitra Palmer.  He was diagnosed 6/2023 by Dr. Priest & is treatment naive.  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  Denies any blood transfusions, IVDU, known HCV + sexual partners.  He is in a long term heterosexual monogamous relationship, partner to seek testing as well.  Last reported drug use about 1 week ago. He is not immune to Hep A or B, amenable to Twinrix vaccination series as recommended. Will start today. FibroScan completed today, results pending.  He is eager to start treatment asap & agrees to adhere to recommended treatment protocol.  He denies jaundice, icterus, nausea, vomiting, dark urine, or mirtha colored stools.  Endorses extreme fatigue. History of colon cancer, followed by Dr. Porter for same. CT of abd 6/14/23, reading reviewed. All questions answered & concerns addressed.           Past Medical History:   Diagnosis Date    Essential (primary) hypertension     Hepatitis a without hepatic coma         Past Surgical History:   Procedure Laterality Date    COLON RESECTION  12/22/2022    INGUINAL HERNIA REPAIR          Social History     Socioeconomic History    Marital status: Single   Tobacco Use    Smoking status: Every Day     Current packs/day: 0.50     Average packs/day: 0.5 packs/day for 39.1 years (19.6 ttl pk-yrs)     Types: Cigarettes     Start date: 1985    Smokeless tobacco: Never   Substance and Sexual Activity    Alcohol use: Not Currently     Comment: no drinking x 20 years    Drug use: Yes     Types: Methamphetamines     Comment: last use 1/1/2024    Sexual activity: Yes     Partners: Female     Birth control/protection: None     Social Determinants of Health     Financial Resource Strain: Medium Risk (1/3/2024)    Overall Financial Resource Strain (CARDIA)     Difficulty of Paying Living Expenses: Somewhat hard   Food Insecurity: Food Insecurity Present (1/3/2024)    Hunger Vital Sign     Worried About Running Out of Food in the Last Year: Sometimes true     Ran Out of Food in the Last Year: Sometimes true   Transportation Needs: No Transportation Needs (1/3/2024)    PRAPARE  - Transportation     Lack of Transportation (Medical): No     Lack of Transportation (Non-Medical): No   Physical Activity: Sufficiently Active (1/3/2024)    Exercise Vital Sign     Days of Exercise per Week: 7 days     Minutes of Exercise per Session: 30 min   Stress: No Stress Concern Present (1/3/2024)    [employer] - Occupational Stress Questionnaire     Feeling of Stress : Not at all   Social Connections: Socially Isolated (1/3/2024)    Social Connection and Isolation Panel [NHANES]     Frequency of Communication with Friends and Family: Never     Frequency of Social Gatherings with Friends and Family: Never     Attends Nondenominational Services: Never     Active Member of Clubs or Organizations: No     Attends Club or Organization Meetings: Never     Marital Status: Never    Housing Stability: Low Risk  (1/3/2024)    Housing Stability Vital Sign     Unable to Pay for Housing in the Last Year: No     Number of Places Lived in the Last Year: 2     Unstable Housing in the Last Year: No        History reviewed. No pertinent family history.     Review of patient's allergies indicates:  No Known Allergies     Immunization History   Administered Date(s) Administered    Hepatitis A / Hepatitis B 07/18/2023, 11/13/2023, 02/16/2024    Influenza - Quadrivalent - PF *Preferred* (6 months and older) 11/13/2023    Tdap 02/03/2020        Review of Systems   Constitutional: Negative.    HENT: Negative.     Eyes: Negative.    Respiratory: Negative.     Cardiovascular: Negative.    Gastrointestinal: Negative.    Genitourinary: Negative.    Musculoskeletal: Negative.    Skin: Negative.    Neurological: Negative.    Endo/Heme/Allergies: Negative.    Psychiatric/Behavioral: Negative.     All other systems reviewed and are negative.         Objective:      /88 (BP Location: Left forearm, Patient Position: Sitting, BP Method: Large (Manual))   Pulse 92   Temp 97.7 °F (36.5 °C) (Oral)   Resp 16   " Ht 5' 10" (1.778 m)   Wt 67.1 kg (148 lb)   BMI 21.24 kg/m²      Physical Exam  Vitals reviewed.   Constitutional:       General: He is not in acute distress.     Appearance: Normal appearance. He is not toxic-appearing.   Eyes:      General: No scleral icterus.  Cardiovascular:      Rate and Rhythm: Normal rate and regular rhythm.      Heart sounds: Normal heart sounds.   Pulmonary:      Effort: Pulmonary effort is normal. No respiratory distress.      Breath sounds: Normal breath sounds.   Abdominal:      General: Bowel sounds are normal. There is no distension.      Palpations: Abdomen is soft. There is no mass.      Tenderness: There is no abdominal tenderness.   Musculoskeletal:         General: Normal range of motion.   Skin:     General: Skin is warm and dry.   Neurological:      Mental Status: He is alert and oriented to person, place, and time.          Labs:   Lab Results   Component Value Date    WBC 7.40 12/11/2023    HGB 14.2 12/11/2023    HCT 42.2 12/11/2023    MCV 91.9 12/11/2023     12/11/2023       CMP  Sodium   Date Value Ref Range Status   12/21/2022 137 136 - 145 mmol/L Final     Sodium Level   Date Value Ref Range Status   12/11/2023 138 136 - 145 mmol/L Final     Potassium   Date Value Ref Range Status   12/21/2022 3.7 3.5 - 5.1 mmol/L Final     Potassium Level   Date Value Ref Range Status   12/11/2023 4.3 3.5 - 5.1 mmol/L Final     Chloride   Date Value Ref Range Status   12/21/2022 107 100 - 109 mmol/L Final     Carbon Dioxide   Date Value Ref Range Status   12/11/2023 21 (L) 22 - 29 mmol/L Final   12/21/2022 23 22 - 33 mmol/L Final     Blood Urea Nitrogen   Date Value Ref Range Status   12/11/2023 24.0 8.4 - 25.7 mg/dL Final   12/21/2022 11 5 - 25 mg/dL Final     Creatinine   Date Value Ref Range Status   12/11/2023 1.48 (H) 0.73 - 1.18 mg/dL Final   12/21/2022 1.07 0.57 - 1.25 mg/dL Final     Calcium   Date Value Ref Range Status   12/21/2022 8.2 (L) 8.8 - 10.6 mg/dL Final "     Calcium Level Total   Date Value Ref Range Status   12/11/2023 9.1 8.4 - 10.2 mg/dL Final     Albumin Level   Date Value Ref Range Status   12/11/2023 3.9 3.5 - 5.0 g/dL Final     Bilirubin Total   Date Value Ref Range Status   12/11/2023 0.3 <=1.5 mg/dL Final     Alkaline Phosphatase   Date Value Ref Range Status   12/11/2023 80 40 - 150 unit/L Final     Aspartate Aminotransferase   Date Value Ref Range Status   12/11/2023 21 5 - 34 unit/L Final     Alanine Aminotransferase   Date Value Ref Range Status   12/11/2023 17 0 - 55 unit/L Final     Anion Gap   Date Value Ref Range Status   12/21/2022 7 (L) 8 - 16 mmol/L Final     eGFR   Date Value Ref Range Status   12/11/2023 55 mls/min/1.73/m2 Final     Lab Results   Component Value Date    TSH 0.3166 (L) 11/30/2022     HCV Genotype   Date Value Ref Range Status   07/06/2023 2 (A) Undetected Final     Comment:        -------------------ADDITIONAL INFORMATION-------------------  This test was performed using the [employer] RealTime HCV   Genotype II assay ([employer] [place], [place]).     Test Performed by:  [place] [address]  : Danyel Lewis M.D. Ph.D.; CLIA# [id number]     HCV RNA Detect/Quant   Date Value Ref Range Status   11/10/2023 Undetected Undetected IU/mL Final     Comment:     Result in log IU/mL is Undetected.     -------------------ADDITIONAL INFORMATION-------------------  The quantification range of this assay is 15 to 100,000,000   IU/mL (1.18 log to 8.00 log IU/mL). Testing was performed   using the marcellus HCV test ([employer]   with the marcellus [employer] System.     Test Performed by:  [place] [address]  : Danyel Lewis M.D. Ph.D.; CLIA# [id number]     HIV   Date Value Ref Range Status   07/06/2023 Nonreactive Nonreactive Final     PT   Date  Value Ref Range Status   07/06/2023 14.3 (H) 11.4 - 14.0 seconds Final     INR   Date Value Ref Range Status   07/06/2023 1.1 <=1.3 Corrected     Comment:     This is a corrected result. Previous result was 1.13 on 7/6/2023 at 1024 CDT.   12/19/2022 1.4  Final     Syphilis Antibody   Date Value Ref Range Status   07/06/2023 Nonreactive Nonreactive, Equivocal Final     Hepatitis B Surface Antigen   Date Value Ref Range Status   07/06/2023 Nonreactive Nonreactive Final     Hepatitis B Surface Antibody   Date Value Ref Range Status   07/06/2023 Nonreactive Nonreactive Final     Hepatitis B Surface Antibody Qnt   Date Value Ref Range Status   07/06/2023 0.38 mIU/mL Final     Comment:     Interpretive Data Hep Bs Ab#  Result (mIU/mL)Interpretation - Hep B Surface Antibody  <8.00Nonreactive: Patient considered not immune to HBV infection  >=8.00 to <12.00Grayzone: Unable to determine immune status. Follow-up testing should be performed  >=12.00Reactive: Patient considered immune to HBV infection       Hepatitis B Core Antibody   Date Value Ref Range Status   07/06/2023 Nonreactive Nonreactive Final     Hep A IGG   Date Value Ref Range Status   07/06/2023 Nonreactive Nonreactive Final     FibroTest Stage   Date Value Ref Range Status   07/06/2023 F0  Final     ActiTest Grade   Date Value Ref Range Status   07/06/2023 A0-A1  Final     Alpha-2-Macroglobulin   Date Value Ref Range Status   07/06/2023 221 100 - 280 mg/dL Final     Haptoglobin   Date Value Ref Range Status   07/06/2023 149 30 - 200 mg/dL Final     Alanine Aminotransferase (ALT)   Date Value Ref Range Status   07/06/2023 42 7 - 55 U/L Final     Gamma Glutamyltransferase (GGT)   Date Value Ref Range Status   07/06/2023 18 8 - 61 U/L Final     Bilirubin, Total   Date Value Ref Range Status   07/06/2023 0.2 <=1.2 mg/dL Final     Comment:        Test Performed by:  [address] [address]  Lab  Director: Danyel Lewis M.D. Ph.D.; CLIA# [id number]     Test Performed by:  [facility]  [address] [place] [address]  : Danyel Lewis M.D. Ph.D.; CLIA# [id number]     Ferritin Level   Date Value Ref Range Status   07/06/2023 29.53 21.81 - 274.66 ng/mL Final       Imaging: Reviewed most recent relevant imaging studies available, notable results highlighted in this note      Medications:     Current Outpatient Medications   Medication Instructions    ENTRESTO 24-26 mg per tablet 1 tablet, Oral, 2 times daily    furosemide (LASIX) 40 mg, Oral, Daily    hyoscyamine (LEVSIN/SL) 0.125 mg, Sublingual, Every 4 hours PRN    metoprolol succinate (TOPROL-XL) 25 mg, Oral, Daily    ondansetron (ZOFRAN-ODT) 4 mg, Oral, Every 8 hours PRN    SYMBICORT 160-4.5 mcg/actuation HFAA Inhalation    VENTOLIN HFA 90 mcg/actuation inhaler Inhalation       Assessment:       Problem List Items Addressed This Visit    None  Visit Diagnoses       Chronic hepatitis C without hepatic coma    -  Primary    Relevant Orders    Hepatitis C Virus Quantitative    Need for vaccination        Relevant Medications    hepatitis A and B vaccine (PF) 720 CALIXTO unit- 20 mcg/mL suspension 720 Units (Completed) (Start on 2/16/2024  3:00 PM)               Plan:      Chronic hepatitis C without hepatic coma  -     Hepatitis C Virus Quantitative; Future; Expected date: 02/16/2024  Diagnosed 6/2023  Treatment naive.  GT 2, baseline VL [id number].  Fibrosure: 7/6/23 A0-1, F0.  FibroScan 7/18/23 S0, F0-1.  CT abd 6/14/23: hemangiomas to liver noted.  Blood precautions: do not share a razor, needle, toothbrush, clippers with anyone.  Avoid all illicits & alcohol.   Increase water intake.  Completed Epclusa 1 po daily x 12 weeks, 8/14/23-11/6/23.  11/10/23 HCV RNA not detected.    Repeat HCV RNA today to assess for SVR 12 status.   Will call pt with results.  Post-treatment cure counseling provided  as follows.     Congratulations on your Hepatitis C cure, to minimize risk of re-infection or progression of liver damage please consider the following recommendations.   Do not share a razor, toothbrush, needle, clippers with anyone.   Avoid all illicit drugs.  Minimize alcohol intake.   Hep C antibody will be positive for life, but does not provide immunity.  If you need repeat testing for Hepatitis C reinfection, will require Hepatitis C Viral Load (RNA) test.  Do not donate blood.  RTC PRN.   Follow-up with PCP for routine medical needs.    Need for vaccination  -     hepatitis A and B vaccine (PF) 720 CALIXTO unit- 20 mcg/mL suspension 720 Units  Twinrix #3 today.

## 2024-02-21 NOTE — TELEPHONE ENCOUNTER
Lab results reviewed.  HCV RNA not detected, confirming cure. Please phone pt with results. Post-treatment counseling has been provided in office. Thank you.

## 2024-02-21 NOTE — TELEPHONE ENCOUNTER
Patient returned call informed HCV RNA was not detected confirming a cure.  Patient informed to minimize the risk of re-infection to consider to following recommendations:     Do not share a razor, toothbrush, clippers, or needles with anyone   Avoid all illicit drugs  Minimize or eliminate ETOH intake  HCV antibody will be positive for life but does not provide immunity  Do not donate blood    If you need repeat testing for HCV reinfection you will require a Hepatitis C Viral Load (RNA) test.  You will not need to return to see MsJoshua Frieda unless you have problems, follow up with your PCP for routine medical care.  All questions answered, voiced understanding.

## 2024-03-21 NOTE — LETTER
March 23, 2024    Sammy Palmer  [address] [address]                   [address] [facility] [address]  Phone: [phone]  Fax: [phone]   March 23, 2024     Patient: Sammy aPlmer   YOB: 1965   Date of Visit: 3/21/2024       To Whom it May Concern:    Ms. Latrice Leal was present at [facility] from 3/21/24-3/23/24 due to unfortunate circumstances regarding a close family member.    Please excuse her from any classes or work missed.    If you have any questions or concerns, please don't hesitate to call. Thank you for your understanding.    Sincerely,       Pb Rodriguez MD

## 2024-03-21 NOTE — ED PROVIDER NOTES
Encounter Date: 3/21/2024       History     Chief Complaint   Patient presents with    Cardiac Arrest     Per EMS, at apartment with family and went to bathroom, found on the floor with meth on the ground. CPR started by EMS at 4:33 pm, 5 epi, 5 shocks, and 4 Narcan (2 IV and 2 IM) given by EMS. EMS performing CPR on arrival. MD at bedside. CPR continued.      Patient presents via EMS cardiac arrest.  Patient was picked up from his home.  Family members state that he went to the bathroom when he came out of the bathroom he collapsed methamphetamines were presumably in the bathroom.  Patient was given 2 mg Narcan IM and 2 mg IV.  Zack tube established by EMS.  Compressions started approximately 4:00 p.m. patient in and out of V-tach VFib and asystole.  Five doses of 1 mg epinephrine provided by EMS.      Review of patient's allergies indicates:  Not on File  Past Medical History:   Diagnosis Date    Cancer     Colon cancer     Hypertension      No past surgical history on file.  No family history on file.     Review of Systems   Unable to perform ROS: Patient unresponsive       Physical Exam     Initial Vitals   BP Pulse Resp Temp SpO2   03/21/24 1648 03/21/24 1648 03/21/24 1648 03/21/24 1648 03/21/24 1643   (!) 143/93 94 20 (!) 95.8 °F (35.4 °C) 100 %      MAP       --                Physical Exam    HENT:   Head: Normocephalic and atraumatic.   Zack tube in place, poor dentition with dental erosions and dental caries and missing teeth   Eyes:   Pupils fixed     Neurological:   GCS 3         ED Course   Critical Care    Date/Time: 3/21/2024 6:56 PM    Performed by: Davey Vines MD  Authorized by: Kartik Romo MD  Total critical care time (exclusive of procedural time) : 35 minutes  Critical care was necessary to treat or prevent imminent or life-threatening deterioration of the following conditions: circulatory failure, cardiac failure and CNS failure or compromise.  Critical care was time spent personally  by me on the following activities: evaluation of patient's response to treatment, examination of patient, ordering and review of laboratory studies, ordering and review of radiographic studies, pulse oximetry and re-evaluation of patient's condition.        Labs Reviewed   COMPREHENSIVE METABOLIC PANEL - Abnormal; Notable for the following components:       Result Value    Carbon Dioxide 15 (*)     Glucose Level 330 (*)     Creatinine 1.61 (*)     Calcium Level Total 8.1 (*)     Protein Total 5.2 (*)     Albumin Level 2.9 (*)     Globulin 2.3 (*)     Aspartate Aminotransferase 78 (*)     All other components within normal limits   CBC WITH DIFFERENTIAL - Abnormal; Notable for the following components:    RBC 4.12 (*)     Hgb 13.2 (*)     Hct 39.8 (*)     MCV 96.6 (*)     MCH 32.0 (*)     IG# 0.28 (*)     All other components within normal limits   BLOOD GAS - Abnormal; Notable for the following components:    pH, Blood gas 7.290 (*)     Base Excess, Blood gas -5.80 (*)     HCO3, Blood gas 20.7 (*)     All other components within normal limits   LACTIC ACID, PLASMA - Abnormal; Notable for the following components:    Lactic Acid Level 7.9 (*)     All other components within normal limits   TROPONIN I - Abnormal; Notable for the following components:    Troponin-I 0.302 (*)     All other components within normal limits   CK-MB - Abnormal; Notable for the following components:    Creatine Kinase MB 10.9 (*)     All other components within normal limits   B-TYPE NATRIURETIC PEPTIDE - Abnormal; Notable for the following components:    Natriuretic Peptide 326.0 (*)     All other components within normal limits   PROTIME-INR - Abnormal; Notable for the following components:    PT 17.2 (*)     INR 1.4 (*)     All other components within normal limits   POCT GLUCOSE - Abnormal; Notable for the following components:    POCT Glucose 225 (*)     All other components within normal limits   MAGNESIUM - Normal   APTT - Normal    CBC W/ AUTO DIFFERENTIAL    Narrative:     The following orders were created for panel order CBC auto differential.  Procedure                               Abnormality         Status                     ---------                               -----------         ------                     CBC with Differential[[id number]]       Abnormal            Final result                 Please view results for these tests on the individual orders.   EXTRA TUBES    Narrative:     The following orders were created for panel order EXTRA TUBES.  Procedure                               Abnormality         Status                     ---------                               -----------         ------                     Light Blue Top Hold[[id number]]                             Final result               Red Top Hold[[id number]]                                    Final result               Light Green Top Hold[[id number]]                            Final result               Lavender Top Hold[[id number]]                               Final result               Pink Top Hold[[id number]]                                   Final result                 Please view results for these tests on the individual orders.   LIGHT BLUE TOP HOLD   RED TOP HOLD   LIGHT GREEN TOP HOLD   LAVENDER TOP HOLD   PINK TOP HOLD     EKG Readings: (Independently Interpreted)   Time 4:51 p.m.   Rate 94, sinus rhythm, left axis deviation with right bundle-branch block, marked T-wave abnormalities    Time 7:00 p.m.   Rate 82, normal sinus rhythm, right bundle-branch block, nonspecific T-wave abnormalities, no STEMI     ECG Results              EKG 12-lead (Chest Pain) Age >30 (Final result)        Collection Time Result Time QRS Duration [facility] QTC Calculation    03/21/24 17:50:30 03/22/24 13:39:23 126 493                     Final result by Interface, Lab In [facility] (03/22/24 13:39:31)                   Narrative:    Test Reason : R07.9,    Vent. Rate : 082 BPM      Atrial Rate : 082 BPM     P-R Int : 146 ms          QRS Dur : 126 ms      QT Int : 422 ms       P-R-T Axes : 015 048 -80 degrees     QTc Int : 493 ms    Normal sinus rhythm  Right bundle branch block  T wave abnormality, consider inferior ischemia  Abnormal ECG  When compared with ECG of 21-MAR-2024 16:51,  Sinus rhythm has replaced Wide QRS rhythm  Confirmed by Lukasz Garcia MD ([id number]) on 3/22/2024 1:39:21 PM    Referred By: [email or web address]   SELF           Confirmed By:Lukasz Garcia MD                                     EKG 12-lead (Final result)        Collection Time Result Time QRS Duration [facility]HS QTC Calculation    03/21/24 16:51:48 03/22/24 13:38:41 156 577                     Final result by Interface, Lab In [facility] (03/22/24 13:38:49)                   Narrative:    Test Reason : I46.9,    Vent. Rate : 094 BPM     Atrial Rate : 129 BPM     P-R Int : 000 ms          QRS Dur : 156 ms      QT Int : 462 ms       P-R-T Axes : 000 -45 -82 degrees     QTc Int : 577 ms    Wide QRS rhythm  Left axis deviation  Right bundle branch block  T wave abnormality, consider inferolateral ischemia  Abnormal ECG  No previous ECGs available  Confirmed by Lukasz Garcia MD ([id number]) on 3/22/2024 1:38:36 PM    Referred By:             Confirmed By:Lukasz Garcia MD                                     EKG 12-LEAD (Final result)  Result time 03/27/24 14:26:51      Final result by Unknown User (03/27/24 14:26:51)                                      Imaging Results              CTA Chest Non-Coronary (PE Studies) (Final result)  Result time 03/22/24 08:49:29      Final result by Michael Trotter MD (03/22/24 08:49:29)                   Impression:      Mildly limited exam with no pulmonary embolism identified.    Mild left lung ground-glass may be contusion or developing pneumonia.    Bilateral rib fractures.  Possible sternal fracture.    Multiple nonspecific solid lung nodules, measuring up to 6 mm.  If no prior studies are available  to ensure stability of these nodules, 3 to six-month follow-up chest CT recommended.    No major discrepancy with the preliminary radiology report.      Electronically signed by: Michael Trotter  Date:    03/22/2024  Time:    08:49               Narrative:    EXAMINATION:  CTA CHEST NON CORONARY (PE STUDIES)    CLINICAL HISTORY:  Pulmonary embolism (PE) suspected, high prob; Chest pain, unspecified    TECHNIQUE:  CTA imaging of the chest after IV contrast. Axial, coronal and sagittal reconstructions, including MIP images, are reviewed. Dose length product 357 mGycm. Automatic exposure control, adjustment of mA/kV or iterative reconstruction technique used to limit radiation dose.    COMPARISON:  No relevant comparison studies available at the time of dictation.    FINDINGS:  Diagnostic quality: Suboptimal    Pulmonary embolism: Assessment of peripheral arterial branches mildly degraded by motion.  No defined filling defect identified.    Lung parenchyma: Endotracheal tube.  Mild emphysema.  Mild ground-glass in the left perihilar and suprahilar lung.  Multiple solid nodules in both lungs.  Some are consistent with granulomas, but some are indeterminate.  Nodules measure up to 6 mm.    Pleural effusion: None.    Mediastinum/rima: Normal heart size and thoracic aortic caliber with mild coronary artery calcification.  No pathologically enlarged lymph node.    Chest wall/axilla: No significant findings.    Upper abdomen: Enteric tube tip reaches the body of the stomach.  Few borderline dilated small bowel segments partially visualized.    Bones: Minimally displaced fractures of the anterior left 2nd through 6th ribs and right 2nd through 4th ribs.  Possible minimally displaced sternal fracture as well.                        Preliminary result by Emilio Aquino Jr., MD (03/21/24 20:33:15)                   Impression:    1. No filling defects are seen in the pulmonary arteries to suggest pulmonary embolus.  2.  There is moderate ground glass opacity in the left upper lobe and superior segment of the left lower lobe. This may reflect an infectious process such as atypical pneumonia or possible pulmonary contusion. Correlate with clinical and laboratory findings as regards additional evaluation and follow-up to full resolution.  3. Fluid distended small bowel loops partially imaged. Suggest CT abdomen/pelvis if there is concern for small bowel obstruction.  4. Details and other findings as discussed above.               Narrative:    START OF REPORT:  Technique: CT Scan of the chest was performed with intravenous contrast with direct axial images as well as sagittal and coronal reconstruction images pulmonary embolus protocol.    Dosage Information: Automated Exposure Control was utilized.    Comparison: None.    Clinical History: Cardiac Arrest (Per EMS, at apartment with family and went to bathroom, found on the floor with meth on the ground. CPR started by EMS at 4:33 pm, 5 epi, 5 shocks, and 4 Narcan (2 IV and 2 IM) given by EMS. EMS performing CPR on arrival. MD at bedside. CPR continued.    Findings:  Soft Tissues: Unremarkable.  Lines and Tubes: Endotracheal tube is present with its tip projecting 32.8 mm above the toby. Enteric tube is seen with its tip within the stomach.  Neck: The visualized soft tissues of the neck appear unremarkable. The right and left thyroid gland appear unremarkable.  Mediastinum: The mediastinal structures are within normal limits.  Heart: Mild cardiomegaly is seen.  Aorta: No aortic dissection or aneurysm is seen. Aortic calcification is seen in the thoracic aorta.  Pulmonary Arteries: No filling defects are seen in the pulmonary arteries to suggest pulmonary embolus.  Lungs: Moderate streaky linear opacity is seen predominantly in the dependent portions at the lung bases consistent with scarring and subsegmental atelectasis. There is moderate ground glass opacity in the left upper lobe  and superior segment of the left lower lobe. This may reflect an infectious process such as atypical pneumonia or possible pulmonary contusion. There are small sized solid and subsolid intraparenchymal nodules in the left upper lobe. Few subpleural micronodules are also seen in both upper lobes. Follow-up as clinically indicated. Minimal emphysematous changes are seen in both upper lobes.  Pleura: No effusions or pneumothorax are identified.  Bony Structures:  Spine: Spondylolytic changes are seen in the thoracic spine.  Ribs: No rib fractures are identified. The visualized bilateral ribs appear unremarkable.  Abdomen: Fluid distended small bowel loops partially imaged. Suggest CT abdomen/pelvis if there is concern for small bowel obstruction.                                         CT Head Without Contrast (Final result)  Result time 03/22/24 07:19:56      Final result by Darci Colmenares MD (03/22/24 07:19:56)                   Impression:      No acute intracranial findings.    No significant discrepancy with the preliminary report.      Electronically signed by: Darci Colmenares  Date:    03/22/2024  Time:    07:19               Narrative:    EXAMINATION:  CT HEAD WITHOUT CONTRAST    CLINICAL HISTORY:  Cardiac arrest, cause unspecifiedMental status change, unknown cause;    TECHNIQUE:  CT imaging of the head performed from the skull base to the vertex without intravenous contrast. DLP 1065 mGycm. Automatic exposure control, adjustment of mA/kV or iterative reconstruction technique was used to reduce radiation.    COMPARISON:  None Available.    FINDINGS:  There is no acute cortical infarct, hemorrhage or mass lesion.  There is some patchy hypoattenuation in the cerebral white matter which is nonspecific but most commonly associated with chronic small vessel ischemic changes.  This is most conspicuous left frontal white matter.  The ventricles are not significantly enlarged.  There are vascular  calcifications.    Visualized paranasal sinuses and mastoid air cells are clear.  Endotracheal and enteric tubes are noted.                        Preliminary result by Emilio Aquino Jr., MD (03/21/24 20:30:18)                   Impression:    1. No acute intracranial process identified. Details and other findings as noted above.               Narrative:    START OF REPORT:  Technique: CT of the head was performed without intravenous contrast with axial as well as coronal and sagittal images.    Comparison: None.    Dosage Information: Automated exposure control was utilized.    Clinical history: Cardiac Arrest (Per EMS, at apartment with family and went to bathroom, found on the floor with meth on the ground. CPR started by EMS at 4:33 pm, 5 epi, 5 shocks, and 4 Narcan (2 IV and 2 IM) given by EMS. EMS performing CPR on arrival. MD at bedside. CPR continued.    Findings:  Hemorrhage: No acute intracranial hemorrhage is seen.  CSF spaces: The ventricles sulci and basal cisterns are within normal limits.  Brain parenchyma: Unremarkable with preservation of the grey white junction throughout.  Cerebellum: Unremarkable.  Vascular: Moderate atheromatous calcification of the intracranial arteries is seen.  Sella and skull base: The sella appears to be within normal limits for age.  Calvarium: No acute linear or depressed skull fracture is seen.    Maxillofacial Structures:  Paranasal sinuses: There is some opacity and mucoperiosteal thickening in the bilateral maxillary sinuses, ethmoid air cells and sphenoid sinuses.  Orbits: The orbits appear unremarkable.  Zygomatic arches: The zygomatic arches are intact and unremarkable.  Temporal bones and mastoids: The temporal bones and mastoids appear unremarkable.  TMJ: The mandibular condyles appear normally placed with respect to the mandibular fossa.  Nasal Bones: No displaced nasal bone fracture is seen.                                         X-Ray Chest 1 View  (Final result)  Result time 03/21/24 17:30:54      Final result by Jose Francisco Sierra MD (03/21/24 17:30:54)                   Impression:      Endotracheal tube in good position    Some interstitial lung changes seen bilaterally and mild pulmonary venous congestion.      Electronically signed by: Steve Sierra  Date:    03/21/2024  Time:    17:30               Narrative:    EXAMINATION:  XR CHEST 1 VIEW    CLINICAL HISTORY:  post intubation;    TECHNIQUE:  Single frontal view of the chest was performed.    COMPARISON:  None available    FINDINGS:  The lungs are hypoaerated with prominent bronchovascular markings seen consistent with hypoaerated lungs.  There is some pulmonary venous congestion centrally.  The heart is upper limits normal in size.  No consolidation is seen.  No pleural effusion is seen.  There is endotracheal to 8.2 cm above the toby.                                       RADIOLOGY REPORT (Final result)  Result time 03/26/24 09:34:07      Final result by Unknown User (03/26/24 09:34:07)                                         Medications   lactated ringers bolus 1,000 mL (0 mLs Intravenous Stopped 3/21/24 1829)   EPINEPHrine 0.1 mg/mL injection (1 mg Intravenous Given 3/21/24 1637)   sodium bicarbonate 8.4 % (1 mEq/mL) injection (50 mEq Intravenous Given 3/21/24 1639)   iohexoL (OMNIPAQUE 350) 350 mg iodine/mL injection (100 mLs Intravenous Given 3/21/24 1845)   calcium gluconate 1 g in NS IVPB (premixed) (0 g Intravenous Stopped 3/23/24 1120)   insulin regular injection 10 Units 0.1 mL (10 Units Intravenous Given 3/23/24 1158)   dextrose 10% bolus 250 mL 250 mL (0 mLs Intravenous Stopped 3/23/24 1151)     Medical Decision Making  Amount and/or Complexity of Data Reviewed  Labs: ordered.  Radiology: ordered.    Risk  Prescription drug management.  Decision regarding hospitalization.                          Medical Decision Making:   Initial Assessment:   Compressions were continued by  nursing staff and code team in the emergency department.  Please see code sheet for details the patient was provided 1 mg of epinephrine as well as an amp of bicarb.  A total of 2 synchronized cardioversions of 120s joules and 150 joules due to persistent ventricular tachycardia. Achieved ROSC.  Patient's EKG shows right bundle-branch block with left axis deviation.  1 L of fluids provided to the patient.    Differential Diagnosis:   Unintentional overdose, intentional overdose, cardiac arrest             Clinical Impression:  Final diagnoses:  [I46.9] Cardiac arrest  [R07.9] Chest pain          ED Disposition Condition    Admit                 Davey Vines MD  03/21/24 [id number]       Davey Vines MD  03/21/24 1908       Davey Vines MD  04/25/24 1414

## 2024-03-21 NOTE — ED NOTES
CPR Ongoing  1636 pulse check, no pulse noted, V fib noted on monitor, 120 J Shock administered, CPR continued  1637 1 mg Epi given IV  1638 pulse check, no pulse noted, V fib noted on monitor, 150 J Shock administered, CPR continued  1639 50 mEq Sodium Bicarbonate administered IV  1641 Pulse check, positive palpable femoral pulse, organized tachycardic rhythm noted  1643 pt intubated by Dr. Davey Vines with 7.5 et tube, with positive color change noted on CO2 detector.  No gurgling over the epigastric region, positive bilateral breath sounds auscultated.     1651 RN placed 18 Fr. @ 60 in R nare.     Charting continued in narrator. Disregard events timed at 1757, 1801, 1812

## 2024-03-22 PROBLEM — E46 MALNUTRITION: Status: ACTIVE | Noted: 2024-01-01

## 2024-03-22 NOTE — ASSESSMENT & PLAN NOTE
Patient meets ASPEN criteria for moderate malnutrition of acute illness or injury per RD assessment as evidenced by:  Energy Intake (Malnutrition): other (see comments) (unable to obtain)  Weight Loss (Malnutrition):  (unable to obtain)  Subcutaneous Fat (Malnutrition): mild depletion  Muscle Mass (Malnutrition): mild depletion           A minimum of two characteristics is recommended for diagnosis of either severe or non-severe malnutrition.

## 2024-03-22 NOTE — PROCEDURES
Arterial Catheter Insertion Procedure Note     Procedure: Insertion of Arterial Catheter     Indications: Hemodynamic Monitoring     Procedure Details   Informed consent was obtained for the procedure: Patient is mechanically ventilated & sedated    The skin above the right radial artery was prepped with Chloroprep. Ultrasound guidance was used to identify the artery. A  22-gauge catheter over a needle was then inserted into the artery. A guide wire was then passed easily through the needle. A arterial catheter was then inserted into the vessel over the guide wire. The needle was then withdrawn and catheter was connected to the monitor to ensure a proper waveform.  A sterile dressing was then applied.     Ultrasound/Sonosite was used during the procedure.      Estimated blood loss: 10 cc    Patient tolerated procedure well.    Jorge Rdz DO  3/22/2024

## 2024-03-22 NOTE — PROGRESS NOTES
Inpatient Nutrition Assessment    Admit Date: 3/21/2024   Total duration of encounter: 1 day   Patient Age: 58 y.o.    Nutrition Recommendation/Prescription     Pt NPO/vent; propfol @ 18.9 ml/hr--providing 499 calories. When stable --suggest use enteral feeds--Peptamen Af @ 20ml/hr; increase as tolerated to goal rate 50ml/hr (23 hr cycle) --1380 calories, 87 gm protein, 933 ml free water. Flush tube with 100 ml water every 4 hrs. (TF + propofol = 1878 calories)  MVI/fe  Biweekly wt  Will monitor nutrition status     Communication of Recommendations: reviewed with nurse    Nutrition Assessment     Malnutrition Assessment/Nutrition-Focused Physical Exam    Malnutrition Context: acute illness or injury (03/22/24 1144)  Malnutrition Level: moderate (03/22/24 1144)  Energy Intake (Malnutrition): other (see comments) (unable to obtain) (03/22/24 1144)  Weight Loss (Malnutrition):  (unable to obtain) (03/22/24 1144)  Subcutaneous Fat (Malnutrition): mild depletion (03/22/24 1144)  Orbital Region (Subcutaneous Fat Loss): mild depletion        Muscle Mass (Malnutrition): mild depletion (03/22/24 1144)  Latter-day Region (Muscle Loss): mild depletion  Clavicle Bone Region (Muscle Loss): mild depletion                             A minimum of two characteristics is recommended for diagnosis of either severe or non-severe malnutrition.    Chart Review    Reason Seen: continuous nutrition monitoring and physician consult for cardiac arrest/vent     Malnutrition Screening Tool Results   Have you recently lost weight without trying?: No  Have you been eating poorly because of a decreased appetite?: No   MST Score: 0   Diagnosis:  Cardiac arrest 2 drug OD/ vent, metabolic acidosis, lactic acidosis, hypothermia, elevated troponin, methamphetamine use     Relevant Medical History: methamphetamine use, colon CA, HTN     Scheduled Medications:  doxycycline IV (PEDS and ADULTS), 100 mg, Q12H  levETIRAcetam (Keppra) IV (PEDS and ADULTS),  1,000 mg, Q12H  mupirocin, , BID  perflutren protein-a microsphr, 3 mL, Once    Continuous Infusions:  NORepinephrine bitartrate-D5W, Last Rate: 0.08 mcg/kg/min (03/22/24 1125)  propofoL, Last Rate: 45 mcg/kg/min (03/22/24 1027)  sodium bicarbonate 150 mEq in dextrose 5 % (D5W) 1,000 mL infusion, Last Rate: 100 mL/hr at 03/22/24 0351    PRN Medications: hydrALAZINE, labetalol, lorazepam, sodium chloride 0.9%    Calorie Containing IV Medications: Diprivan @ 18.9 ml/hr (provides 499 kcal/d)    Recent Labs   Lab 03/21/24  1726 03/21/24  1732 03/21/24  2034 03/21/24  2135 03/21/24  2142 03/21/24  2315 03/22/24  0310 03/22/24  0844     --  137  --   --  137 138 140   K 4.4  --  4.7  --   --  5.1 4.0 3.4*   CALCIUM 8.1*  --  8.7  --   --  9.1 8.8 8.3*   PHOS  --  7.6*  --   --   --  2.6  --  2.9   MG 2.40  --   --   --   --  2.30  --  2.20   CHLORIDE 107  --  109*  --   --  109* 109* 107   CO2 15*  --  17*  --   --  16* 14* 16*   BUN 18.0  --  25.5  --   --  29.9* 38.9* 47.4*   CREATININE 1.61*  --  1.97*  --   --  2.41* 2.58* 2.72*   EGFRNORACEVR 49  --  39  --   --  30 28 26   GLUCOSE 330*  --  144* 172*  --  173* 212* 195*   BILITOT 0.5  --  0.5  --   --   --  1.0  --    ALKPHOS 78  --  109  --   --   --  96  --    ALT 54  --  91*  --   --   --  103*  --    AST 78*  --  201*  --   --   --  272*  --    ALBUMIN 2.9*  --  4.0  --   --   --  3.8  --    HGBA1C  --   --   --   --  4.9  --   --   --    WBC 10.39  --  18.62*  --   --   --   --   --    HGB 13.2*  --  17.3  --   --   --   --   --    HCT 39.8*  --  51.0  --   --   --   --   --      Nutrition Orders:  Diet NPO      Appetite/Oral Intake: NPO/NPO  Factors Affecting Nutritional Intake: NPO and on mechanical ventilation  Food/Hindu/Cultural Preferences: unable to obtain  Food Allergies: unable to obtain  Last Bowel Movement: 03/20/24  Wound(s):  none reported    Comments    (3/22) Pt NPO/vent; S/P cardiac arrest/drug use; pt on propofol @ 18ml/hr; RN  "reported decreased urine output; abnormal labs noted: Bun/Cr, LFTs--? renal/liver dysfunction. TF recs provided. Family aware poor prognosis; discussing POC options. Pt thin build--mild fat/muscle wasting    Anthropometrics    Height: 5' (152.4 cm),    Last Weight: 69.9 kg (154 lb) (03/22/24 0900), Weight Method: Bed Scale (pt last known weight)  BMI (Calculated): 30.1; ? True ht--unable to obtain--pt thin; not obese--? HT--5'10" (appearance)   BMI Classification: obese grade I (BMI 30-34.9)        Ideal Body Weight (IBW), Male: 106 lb     % Ideal Body Weight, Male (lb): 145.28 %                 Usual Body Weight (UBW), kg:  (pt on vent ; unable to obtain)        Usual Weight Provided By: unable to obtain usual weight    Wt Readings from Last 5 Encounters:   03/22/24 69.9 kg (154 lb)     Weight Change(s) Since Admission: unable to obtain wt hx  Wt Readings from Last 1 Encounters:   03/22/24 0900 69.9 kg (154 lb)   03/21/24 2030 70 kg (154 lb 5.2 oz)   03/21/24 1713 70 kg (154 lb 5.2 oz)   Admit Weight: 70 kg (154 lb 5.2 oz) (03/21/24 1713), Weight Method: Bed Scale (pt last known weight)    Estimated Needs    Weight Used For Calorie Calculations: 69.9 kg (154 lb 1.6 oz)  Energy Calorie Requirements (kcal): 1747 kcal/d; 25 landon/kg /vent  Energy Need Method: Kcal/kg  Weight Used For Protein Calculations: 69.9 kg (154 lb 1.6 oz)  Protein Requirements: 84 gm protein/d; 1.2 gm/kg --monitor protein load/ renal dysfunction  Fluid Requirements (mL): 1747n l/d; 1ml/landon    Enteral Nutrition     Patient not receiving enteral nutrition at this time.    Parenteral Nutrition     Patient not receiving parenteral nutrition support at this time.    Evaluation of Received Nutrient Intake    Calories: not meeting estimated needs  Protein: not meeting estimated needs    Patient Education     Not applicable.    Nutrition Diagnosis     PES: Inadequate oral intake related to acute illness as evidenced by NPO. (new)     PES: Moderate acute " disease or injury related malnutrition related to acute illness as evidenced by mild fat depletion and mild muscle depletion. (new)    Nutrition Interventions     Intervention(s): modified composition of enteral nutrition, modified rate of enteral nutrition, and collaboration with other providers    Goal: Meet greater than 80% of nutritional needs by follow-up. (new)  Goal: Maintain weight throughout hospitalization. (new)    Nutrition Goals & Monitoring     Dietitian will monitor: enteral nutrition intake, weight, and electrolyte/renal panel    Nutrition Risk/Follow-Up: moderate (follow-up in 3-5 days)   Please consult if re-assessment needed sooner.

## 2024-03-22 NOTE — PROGRESS NOTES
[facility] - Emergency Dept  Pulmonary Critical Care Note    Patient Name: Sammy Palmer  MRN: [id number]  Admission Date: 3/21/2024  Hospital Length of Stay: 1 days  Code Status: Full Code  Attending Provider: Kartik Romo MD  Primary Care Provider: Mely, Primary Doctor     Subjective:     HPI:   Sammy Palmer is a 58-year-old male with a history of HFrEF, methamphetamine use presents to the emergency department in cardiac arrest. Per EMS, patient was found at home down with methamphetamine next to him after unknown time while at family's house. CPR was initiated by family at 1600 when patient was found and EMS arrived and continued CPR for 40 mins total starting at 1633.  Patient received 5 shocks, 5 epi, and for Narcan (2 IV and to IM) in the field.  Upon arrival to the emergency department, 2 synchronized cardioversions of 120 joules and 150 joules respectively due to persistent ventricular tachycardia and 1 mg of epinephrine as well as an amp of bicarb administered.  Achieved ROSC after 10 mins of ACLS in the ED.  CMP showed a bicarb of 15, creatinine of 1.61, glucose 330, and calcium of 8.1.  AST and ALT within normal limits.  Initial lactic acid level 7.9. UDS was positive for methamphetamines. Initial ABG showed 7.29, PCO2 43, HCO3 20.7. initial troponin 0.302, CK-MB 10.9, , lactic acid 7.9, PT/INR 17.2/1.4. , CT head w/o contrast showed patchy hypoattenuation in the cerebral white matter which is nonspecific but most commonly associated with chronic small vessel ischemic changes.     Hospital Course/Significant events:  Admitted to the ICU 03/21/2024    24 Hour Interval History:  No acute events overnight. -90s/70-50s,  Troponins 19.172 this AM. BUN/Cr 47.4/2.72, ALT//272. Urine output 1125. Currently on Bicarb 150 mEq in D5W at 100 mL/hr. ABG this AM pH 7.37, pCO2 28, pHCO3 16.2.       Past Medical History:   Diagnosis Date    Cancer     Colon cancer     Hypertension         History reviewed. No pertinent surgical history.    Social History     Socioeconomic History    Marital status: Single           No current outpatient medications    Current Inpatient Medications   doxycycline IV (PEDS and ADULTS)  100 mg Intravenous Q12H    levETIRAcetam (Keppra) IV (PEDS and ADULTS)  1,000 mg Intravenous Q12H    mupirocin   Nasal BID    perflutren protein-a microsphr  3 mL Intravenous Once       Current Intravenous Infusions   NORepinephrine bitartrate-D5W 0.04 mcg/kg/min (03/22/24 0931)    propofoL 40 mcg/kg/min (03/22/24 0837)    sodium bicarbonate 150 mEq in dextrose 5 % (D5W) 1,000 mL infusion 100 mL/hr at 03/22/24 0351         Review of Systems   Unable to perform ROS: Intubated          Objective:       Intake/Output Summary (Last 24 hours) at 3/22/2024 1017  Last data filed at 3/22/2024 0952  Gross per 24 hour   Intake 1793.79 ml   Output 1625 ml   Net 168.79 ml         Vital Signs (Most Recent):  Temp: 96.3 °F (35.7 °C) (03/22/24 0800)  Pulse: 89 (03/22/24 0930)  Resp: (!) 29 (03/22/24 0930)  BP: (!) 89/65 (03/22/24 0900)  SpO2: (!) 82 % (03/22/24 0930)  Body mass index is 30.08 kg/m².  Weight: 69.9 kg (154 lb) Vital Signs (24h Range):  Temp:  [95.5 °F (35.3 °C)-97.9 °F (36.6 °C)] 96.3 °F (35.7 °C)  Pulse:  [] 89  Resp:  [20-39] 29  SpO2:  [82 %-100 %] 82 %  BP: ()/() 89/65  Arterial Line BP: ()/() 84/53     Physical Exam  Constitutional:       Appearance: He is ill-appearing and toxic-appearing.   HENT:      Head: Normocephalic and atraumatic.      Right Ear: Tympanic membrane normal.      Left Ear: Tympanic membrane normal.      Nose: Nose normal.      Mouth/Throat:      Mouth: Mucous membranes are dry.   Eyes:      Pupils:      Right eye: Pupil is not reactive.      Left eye: Pupil is not reactive.      Comments: Pupils pinpoint and nonreactive   Cardiovascular:      Rate and Rhythm: Normal rate and regular rhythm.      Pulses: Normal pulses.       Heart sounds: Normal heart sounds. No murmur heard.     No friction rub. No gallop.      Comments: Bilateral lower extremities cold  Pulses present to the bilateral upper and lower extremities including radial pulse, carotid pulse, and femoral pulse  Pulmonary:      Effort: Pulmonary effort is normal. No respiratory distress.      Breath sounds: Normal breath sounds.   Abdominal:      General: Bowel sounds are normal. There is no distension.      Palpations: Abdomen is soft. There is no mass.   Genitourinary:     Penis: Normal.    Musculoskeletal:         General: No swelling or tenderness.      Cervical back: Normal range of motion and neck supple.      Right lower leg: No edema.      Left lower leg: No edema.   Skin:     Capillary Refill: Capillary refill takes less than 2 seconds.      Comments: Skin is cool to touch   Neurological:      Mental Status: He is unresponsive.      GCS: GCS eye subscore is 1. GCS verbal subscore is 1. GCS motor subscore is 1.      Cranial Nerves: No cranial nerve deficit.      Comments: Corneal reflex absent  No response to pain or touch  Absent reflexes           Lines/Drains/Airways       Drain  Duration                  NG/OG Tube 03/21/24 1651 18 Fr. Right nostril <1 day         Urethral Catheter 03/21/24 2045 Silicone;Non-latex 16 Fr. <1 day              Airway  Duration                  Airway - Non-Surgical 03/21/24 1643 Endotracheal Tube <1 day              Arterial Line  Duration             Arterial Line 03/21/24 2300 Right Radial <1 day              Peripheral Intravenous Line  Duration                  Peripheral IV - Single Lumen 03/21/24 18 G Right Antecubital 1 day         Peripheral IV - Double Lumen 03/21/24 1700 18 G;20 G Anterior;Distal;Left Upper Arm <1 day         Peripheral IV - Double Lumen 03/22/24 0151 18 G;20 G Anterior;Right Upper Arm <1 day         Peripheral IV - Single Lumen 03/21/24 1637 18 G Left;Posterior Hand <1 day                    Significant  Labs:    Lab Results   Component Value Date    WBC 18.62 (H) 03/21/2024    HGB 17.3 03/21/2024    HCT 51.0 03/21/2024    MCV 92.2 03/21/2024     03/21/2024           BMP  Lab Results   Component Value Date     03/22/2024    K 3.4 (L) 03/22/2024    CHLORIDE 107 03/22/2024    CO2 16 (L) 03/22/2024    BUN 47.4 (H) 03/22/2024    CREATININE 2.72 (H) 03/22/2024    CALCIUM 8.3 (L) 03/22/2024    AGAP 17.0 03/22/2024         ABG  Recent Labs   Lab 03/22/24  0440   PH 7.370   PO2 70.0*   PCO2 28.0*   HCO3 16.2*   POCBASEDEF -7.20*       Mechanical Ventilation Support:  Vent Mode: A/C (03/22/24 0900)  Set Rate: 20 BPM (03/22/24 0900)  Vt Set: 500 mL (03/22/24 0900)  PEEP/CPAP: 10 cmH20 (03/22/24 0900)  Oxygen Concentration (%): 30 (03/22/24 0900)  Peak Airway Pressure: 19.6 cmH20 (03/22/24 0900)  Total Ve: 19.4 L/m (03/22/24 0900)  F/VT Ratio<105 (RSBI): (!) 75.55 (03/22/24 0900)      Significant Imaging:  I have reviewed the pertinent imaging within the past 24 hours.        Assessment/Plan:     Assessment  Cardiac arrest likely secondary to drug overdose  Intubated and on Mechanical ventilation with a metabolic acidosis  Lactic acidosis  Hypothermia  Elevated troponin  Methamphetamine use      Plan  CPR in the field for 40 minutes with no ROSC, ROSC upon arrival to the emergency department  Patient intubated in the field with a Zack tube, 7 Guatemalan placed here  ABG shows a pH of 7.37, pCO3 16.2 and pCO2 of 28, bicarb drip 3 amps and D5W  Continue with bicarb drip   Echo this AM  Trending lactate, improving  Continue mechanical ventilation  Currently on hypothermic protocol with 2hr glucose checks  Family informed at bedside about poor prognosis  Troponin 19.172 this AM      DVT Prophylaxis:  Holding at this time due to traumatic CPR  GI Prophylaxis:  None     32 minutes of critical care was time spent personally by me on the following activities: development of treatment plan with patient or surrogate and bedside  caregivers, discussions with consultants, evaluation of patient's response to treatment, examination of patient, ordering and performing treatments and interventions, ordering and review of laboratory studies, ordering and review of radiographic studies, pulse oximetry, re-evaluation of patient's condition.  This critical care time did not overlap with that of any other provider or involve time for any procedures.     Jorge Lofton MD  Pulmonary Critical Care Medicine  [facility] - 4th floor ICU  DOS: 03/22/2024

## 2024-03-22 NOTE — PLAN OF CARE
03/22/24 1128   Discharge Assessment   Assessment Type Discharge Planning Assessment   Confirmed/corrected address, phone number and insurance Yes   Confirmed Demographics Correct on Facesheet   Source of Information family   Reason For Admission Cardiac arrest, Chest pain   People in Home significant other   Facility Arrived From: Home   Do you expect to return to your current living situation? Yes   Do you have help at home or someone to help you manage your care at home? Yes   Who are your caregiver(s) and their phone number(s)? Alma Palmer  Sister  [phone]    2  Srikanth Palmer  Daughter  [phone]    3  Ceci Romo  Spouse  [phone]   Prior to hospitilization cognitive status: Alert/Oriented   Current cognitive status: Coma/Sedated/Intubated   Walking or Climbing Stairs Difficulty no   Dressing/Bathing Difficulty no   Equipment Currently Used at Home none   Readmission within 30 days? No   Patient currently being followed by outpatient case management? No   Do you currently have service(s) that help you manage your care at home? No   Do you take prescription medications? Yes   Do you have prescription coverage? Yes   Coverage [employer] M/D   Do you have any problems affording any of your prescribed medications? No   Who is going to help you get home at discharge? Family   How do you get to doctors appointments? car, drives self;family or friend will provide   Are you on dialysis? No   Discharge Plan A Home with family   Discharge Plan B Skilled Nursing Facility   DME Needed Upon Discharge    (Pending PT eval)   Discharge Plan discussed with: Spouse/sig other   Name(s) and Number(s) Alma Palmer  [phone]   Transition of Care Barriers Substance Abuse   Physical Activity   On average, how many days per week do you engage in moderate to strenuous exercise (like a brisk walk)? Pt Unable   On average, how many minutes do you engage in exercise at this level? Pt Unable    Financial Resource Strain   How hard is it for you to pay for the very basics like food, housing, medical care, and heating? Pt Unable   Housing Stability   In the last 12 months, was there a time when you were not able to pay the mortgage or rent on time? Pt Unable   In the last 12 months, was there a time when you did not have a steady place to sleep or slept in a shelter (including now)? Pt Unable   Transportation Needs   In the past 12 months, has lack of transportation kept you from medical appointments or from getting medications? Pt Unable   In the past 12 months, has lack of transportation kept you from meetings, work, or from getting things needed for daily living? Pt Unable   Food Insecurity   Within the past 12 months, you worried that your food would run out before you got the money to buy more. Pt Unable   Within the past 12 months, the food you bought just didn't last and you didn't have money to get more. Pt Unable   Stress   Do you feel stress - tense, restless, nervous, or anxious, or unable to sleep at night because your mind is troubled all the time - these days? Pt Unable   Social Connections   In a typical week, how many times do you talk on the phone with family, friends, or neighbors? Pt Unable   How often do you get together with friends or relatives? Pt Unable   How often do you attend Judaism or Taoist services? Pt Unable   Do you belong to any clubs or organizations such as Judaism groups, unions, fraternal or athletic groups, or school groups? Pt Unable   How often do you attend meetings of the clubs or organizations you belong to? Pt Unable   Are you , , , , never , or living with a partner? Living with   Alcohol Use   Q1: How often do you have a drink containing alcohol? Pt Unable   Q2: How many drinks containing alcohol do you have on a typical day when you are drinking? Pt Unable   Q3: How often do you have six or more drinks on one occasion?  Pt Unable   OTHER   Name(s) of People in Home Ceci Romo  [phone]     Pt single with 3 children (2 in [place], 1 incarcerated); Resides with Ceci MELGAR; Independent with ADL's prior to admission; Pt unemployed - receives disability benefits; Alma Palmer ([phone]) emergency contact; No POA - Sister stated fly will make decisions together; CM to follow.

## 2024-03-23 NOTE — PLAN OF CARE
Problem: Fall Injury Risk  Goal: Absence of Fall and Fall-Related Injury  Outcome: Ongoing, Progressing     Problem: Restraint, Behavioral (Acute Care)  Goal: Absence of Harm or Injury  Outcome: Ongoing, Progressing     Problem: Adult Inpatient Plan of Care  Goal: Absence of Hospital-Acquired Illness or Injury  Outcome: Ongoing, Progressing  Goal: Optimal Comfort and Wellbeing  Outcome: Ongoing, Progressing     Problem: Infection  Goal: Absence of Infection Signs and Symptoms  Outcome: Ongoing, Progressing     Problem: Restraint, Nonbehavioral (Nonviolent)  Goal: Absence of Harm or Injury  Outcome: Ongoing, Progressing     Problem: Adult Inpatient Plan of Care  Goal: Readiness for Transition of Care  Outcome: Ongoing, Not Progressing

## 2024-03-23 NOTE — PROCEDURES
Sammy Palmer is a 58 y.o. male patient.    Temp: (!) 94.3 °F (34.6 °C) (03/22/24 2100)  Pulse: 78 (03/22/24 2117)  Resp: (!) 27 (03/22/24 2117)  BP: 106/89 (03/22/24 2100)  SpO2: (!) 81 % (03/22/24 2100)  Weight: 69.9 kg (154 lb) (03/22/24 0900)  Height: 5' (152.4 cm) (03/22/24 0900)    PICC  Date/Time: 3/22/2024 10:02 PM  Performed by: Regino Rowell RN  Consent Done: Yes  Time out: Immediately prior to procedure a time out was called to verify the correct patient, procedure, equipment, support staff and site/side marked as required  Indications: med administration and vascular access  Anesthesia: local infiltration  Local anesthetic: lidocaine 1% without epinephrine  Anesthetic Total (mL): 5  Preparation: skin prepped with ChloraPrep  Skin prep agent dried: skin prep agent completely dried prior to procedure  Sterile barriers: all five maximum sterile barriers used - cap, mask, sterile gown, sterile gloves, and large sterile sheet  Hand hygiene: hand hygiene performed prior to central venous catheter insertion  Location details: right brachial  Catheter type: triple lumen  Catheter size: 5 Fr  Catheter Length: 38cm    Ultrasound guidance: yes  Vessel Caliber: large, compressibility normal  Needle advanced into vessel with real time Ultrasound guidance.  Guidewire confirmed in vessel.  Sterile sheath used.  Number of attempts: 1  Post-procedure: blood return through all ports and sterile dressing applied    Comments: On LEVO and Bicarb          Name Regino Rowell RN  3/22/2024

## 2024-03-23 NOTE — PROGRESS NOTES
[facility] - Emergency Dept  Pulmonary Critical Care Note    Patient Name: Sammy Palmer  MRN: [id number]  Admission Date: 3/21/2024  Hospital Length of Stay: 2 days  Code Status: Full Code  Attending Provider: Kartik Romo MD  Primary Care Provider: Mely, Primary Doctor     Subjective:     HPI:   Sammy Palmer is a 58-year-old male with a history of HFrEF, methamphetamine use presents to the emergency department in cardiac arrest. Per EMS, patient was found at home down with methamphetamine next to him after unknown time while at family's house. CPR was initiated by family at 1600 when patient was found and EMS arrived and continued CPR for 40 mins total starting at 1633.  Patient received 5 shocks, 5 epi, and for Narcan (2 IV and to IM) in the field.  Upon arrival to the emergency department, 2 synchronized cardioversions of 120 joules and 150 joules respectively due to persistent ventricular tachycardia and 1 mg of epinephrine as well as an amp of bicarb administered.  Achieved ROSC after 10 mins of ACLS in the ED.  CMP showed a bicarb of 15, creatinine of 1.61, glucose 330, and calcium of 8.1.  AST and ALT within normal limits.  Initial lactic acid level 7.9. UDS was positive for methamphetamines. Initial ABG showed 7.29, PCO2 43, HCO3 20.7. initial troponin 0.302, CK-MB 10.9, , lactic acid 7.9, PT/INR 17.2/1.4. , CT head w/o contrast showed patchy hypoattenuation in the cerebral white matter which is nonspecific but most commonly associated with chronic small vessel ischemic changes.     Hospital Course/Significant events:  Admitted to the ICU 03/21/2024 03/23/24: Weaned off Sedation for neurocheck; no response    24 Hour Interval History:  Nurse reports of worsening renal indicies; patient consistently hypoglycemia despite D10 boluses, ranging from 30-50s. Phosphorus 10.7, Potassium 6.7 this AM, requiring insulin shifting with D10 bolus. BP is worsening to 80s/60s compared  120-90s/70-50s since admission, requiring increased inotropic support. BUN/Cr 69.4/4.07, GFR 16, ALT/AST 98/187. Urine output 1225. Bicarb 22 this AM; Currently on Bicarb 150 mEq in D5W at 100 mL/hr. ABG this AM pH 7.43, pCO2 38, HCO3 25.2. Patient requiring mechanical ventilator support; not breathing on own. Spoke with family at bedside and their wishes are DNR/DNI with no further plan for escalation of services.     Past Medical History:   Diagnosis Date    Cancer     Colon cancer     Hypertension      History reviewed. No pertinent surgical history.    Social History     Socioeconomic History    Marital status: Single     Social Determinants of Health     Financial Resource Strain: Patient Unable To Answer (3/22/2024)    Overall Financial Resource Strain (CARDIA)     Difficulty of Paying Living Expenses: Patient unable to answer   Food Insecurity: Patient Unable To Answer (3/22/2024)    Hunger Vital Sign     Worried About Running Out of Food in the Last Year: Patient unable to answer     Ran Out of Food in the Last Year: Patient unable to answer   Transportation Needs: Patient Unable To Answer (3/22/2024)    PRAPARE - Transportation     Lack of Transportation (Medical): Patient unable to answer     Lack of Transportation (Non-Medical): Patient unable to answer   Physical Activity: Patient Unable To Answer (3/22/2024)    Exercise Vital Sign     Days of Exercise per Week: Patient unable to answer     Minutes of Exercise per Session: Patient unable to answer   Stress: Patient Unable To Answer (3/22/2024)    [employer] [place] [employer] - Occupational Stress Questionnaire     Feeling of Stress : Patient unable to answer   Social Connections: Unknown (3/22/2024)    Social Connection and Isolation Panel [NHANES]     Frequency of Communication with Friends and Family: Patient unable to answer     Frequency of Social Gatherings with Friends and Family: Patient unable to answer     Attends Alevism Services:  Patient unable to answer     Active Member of Clubs or Organizations: Patient unable to answer     Attends Club or Organization Meetings: Patient unable to answer     Marital Status: Living with partner   Housing Stability: Patient Unable To Answer (3/22/2024)    Housing Stability Vital Sign     Unable to Pay for Housing in the Last Year: Patient unable to answer     Unstable Housing in the Last Year: Patient unable to answer     No current outpatient medications    Current Inpatient Medications   doxycycline IV (PEDS and ADULTS)  100 mg Intravenous Q12H    levETIRAcetam (Keppra) IV (PEDS and ADULTS)  1,000 mg Intravenous Q12H    mupirocin   Nasal BID    perflutren protein-a microsphr  3 mL Intravenous Once    sodium chloride 0.9%  10 mL Intravenous Q6H     Current Intravenous Infusions   NORepinephrine bitartrate-D5W 0.98 mcg/kg/min (03/23/24 1140)    propofoL Stopped (03/23/24 0947)    sodium bicarbonate 150 mEq in dextrose 5 % (D5W) 1,000 mL infusion 100 mL/hr at 03/23/24 1019     Review of Systems   Unable to perform ROS: Intubated          Objective:       Intake/Output Summary (Last 24 hours) at 3/23/2024 1203  Last data filed at 3/23/2024 1120  Gross per 24 hour   Intake 4483.12 ml   Output 725 ml   Net 3758.12 ml           Vital Signs (Most Recent):  Temp: (!) 100.4 °F (38 °C) (03/23/24 1100)  Pulse: (!) 118 (03/23/24 1130)  Resp: (!) 30 (03/23/24 1130)  BP: 94/77 (03/23/24 1130)  SpO2: (!) 89 % (03/23/24 1130)  Body mass index is 30.08 kg/m².  Weight: 69.9 kg (154 lb) Vital Signs (24h Range):  Temp:  [95.7 °F (35.4 °C)-100.4 °F (38 °C)] 100.4 °F (38 °C)  Pulse:  [] 118  Resp:  [14-35] 30  SpO2:  [80 %-98 %] 89 %  BP: ()/(33-94) 94/77  Arterial Line BP: (0-108)/(0-85) 98/63     Physical Exam  Constitutional:       Appearance: He is ill-appearing and toxic-appearing.   HENT:      Head: Normocephalic and atraumatic.      Right Ear: Tympanic membrane normal.      Left Ear: Tympanic membrane normal.       Nose: Nose normal.      Mouth/Throat:      Mouth: Mucous membranes are dry.   Eyes:      Pupils:      Right eye: Pupil is not reactive.      Left eye: Pupil is not reactive.      Comments: Pupils pinpoint and nonreactive   Cardiovascular:      Rate and Rhythm: Normal rate and regular rhythm.      Pulses: Normal pulses.      Heart sounds: Normal heart sounds. No murmur heard.     No friction rub. No gallop.      Comments: Bilateral lower extremities cold  Pulses present to the bilateral upper and lower extremities including radial pulse, carotid pulse, and femoral pulse  Pulmonary:      Effort: Pulmonary effort is normal. No respiratory distress.      Breath sounds: Normal breath sounds.   Abdominal:      General: Bowel sounds are normal. There is no distension.      Palpations: Abdomen is soft. There is no mass.   Genitourinary:     Penis: Normal.    Musculoskeletal:         General: No swelling or tenderness.      Cervical back: Normal range of motion and neck supple.      Right lower leg: No edema.      Left lower leg: No edema.   Skin:     Capillary Refill: Capillary refill takes less than 2 seconds.      Comments: Skin is cool to touch   Neurological:      Mental Status: He is unresponsive.      GCS: GCS eye subscore is 1. GCS verbal subscore is 1. GCS motor subscore is 1.      Cranial Nerves: Cranial nerve deficit present.      Comments: Corneal reflex absent   No response to pain or touch  Absent reflexes  With no sedation           Lines/Drains/Airways       Central Venous Catheter Line  Duration             Percutaneous Central Line - Triple Lumen  03/22/24 2200 Brachial Right <1 day              Drain  Duration                  NG/OG Tube 03/21/24 1651 18 Fr. Right nostril 1 day         Urethral Catheter 03/21/24 2045 Silicone;Non-latex 16 Fr. 1 day         Rectal Tube 03/23/24 0242 rectal tube w/ balloon (indicate number of mLs) <1 day              Airway  Duration                  Airway -  Non-Surgical 03/21/24 1643 Endotracheal Tube 1 day              Arterial Line  Duration             Arterial Line 03/21/24 2300 Right Radial 1 day              Peripheral Intravenous Line  Duration                  Peripheral IV - Single Lumen 03/21/24 18 G Right Antecubital 2 days         Peripheral IV - Double Lumen 03/21/24 1700 18 G;20 G Anterior;Distal;Left Upper Arm 1 day         Peripheral IV - Double Lumen 03/22/24 0151 18 G;20 G Anterior;Right Upper Arm 1 day         Peripheral IV - Single Lumen 03/21/24 1637 18 G Left;Posterior Hand 1 day                    Significant Labs:    Lab Results   Component Value Date    WBC 10.64 03/23/2024    HGB 16.2 03/23/2024    HCT 45.0 03/23/2024    MCV 86.9 03/23/2024     03/23/2024     BMP  Lab Results   Component Value Date     (L) 03/23/2024    K 6.7 (HH) 03/23/2024    CHLORIDE 91 (L) 03/23/2024    CO2 19 (L) 03/23/2024    BUN 69.4 (H) 03/23/2024    CREATININE 4.07 (H) 03/23/2024    CALCIUM 7.0 (L) 03/23/2024    AGAP 23.0 03/23/2024     ABG  Recent Labs   Lab 03/23/24  0509   PH 7.430   PO2 219.0*   PCO2 38.0   HCO3 25.2   POCBASEDEF 1.00       Mechanical Ventilation Support:  Vent Mode: A/C (03/22/24 0900)  Set Rate: 20 BPM (03/22/24 0900)  Vt Set: 500 mL (03/22/24 0900)  PEEP/CPAP: 10 cmH20 (03/22/24 0900)  Oxygen Concentration (%): 30 (03/22/24 0900)  Peak Airway Pressure: 19.6 cmH20 (03/22/24 0900)  Total Ve: 19.4 L/m (03/22/24 0900)  F/VT Ratio<105 (RSBI): (!) 75.55 (03/22/24 0900)    Significant Imaging:  I have reviewed the pertinent imaging within the past 24 hours.    Assessment/Plan:     Assessment  Cardiac arrest likely secondary to drug overdose  CPR in the field for 40 minutes with no ROSC, ROSC upon arrival to the emergency department after 10 additional mins of ACLS; multiple rounds of epinephrine delivered  Patient intubated in the field with a Zack tube, 7 Marshallese placed here  Intubated and on Mechanical ventilation with a metabolic  acidosis  Lactic acidosis  Hypothermia  Elevated troponin  Methamphetamine use  Hyperkalemia; Hyperphosphatemia   Hypoglycemia  Renal dysfunction      Plan  ABG shows a pH of 7.37, pCO3 16.2 and pCO2 of 28, bicarb drip 3 amps and D5W  Continue with bicarb drip   Echo on 03/22/23 - EF unable to assess but shows mild reduction  Neuro check performed while off sedation; absent corneal/light pupillary reflex, pain response.  Continue mechanical ventilation  Currently on requiring multiple D10 boluses for hypoglycemia  Shifting potassium with insulin at this time  Family informed at bedside about poor prognosis; family wishes patient to be DNR/DNI without escalation of care at this time.   Will plan for next step in palliative extubation; will discuss with family       DVT Prophylaxis: none  GI Prophylaxis: None     32 minutes of critical care was time spent personally by me on the following activities: development of treatment plan with patient or surrogate and bedside caregivers, discussions with consultants, evaluation of patient's response to treatment, examination of patient, ordering and performing treatments and interventions, ordering and review of laboratory studies, ordering and review of radiographic studies, pulse oximetry, re-evaluation of patient's condition.  This critical care time did not overlap with that of any other provider or involve time for any procedures.     Jorge Lofton MD  Pulmonary Critical Care Medicine  [facility] - 4th floor ICU  DOS: 03/23/2024

## 2024-03-24 LAB
BACTERIA SPT CULT: ABNORMAL
BACTERIA SPT CULT: ABNORMAL
BACTERIA UR CULT: NO GROWTH
POCT GLUCOSE: 80 MG/DL (ref 70–110)

## 2024-03-25 LAB — NSE SERPL-MCNC: 39 NG/ML

## 2024-03-27 LAB
BACTERIA BLD CULT: NORMAL
BACTERIA BLD CULT: NORMAL

## 2024-03-28 ENCOUNTER — DOCUMENTATION ONLY (OUTPATIENT)
Dept: HEMATOLOGY/ONCOLOGY | Facility: CLINIC | Age: 59
End: 2024-03-28
Payer: MEDICAID

## 2024-04-01 NOTE — DISCHARGE SUMMARY
[facility] Internal Medicine Discharge Summary    Admitting Physician: Kartik Romo MD  Attending Physician: Srinivasa Sanon MD  Date of Admit: 3/21/2024  Date of Discharge: 3/23/24    Condition:     Discharge Diagnoses     Patient Active Problem List   Diagnosis    Malnutrition       Principal Problem:  <principal problem not specified>    Consultants and Procedures     Consultants:  IP CONSULT TO REGISTERED DIETITIAN/NUTRITIONIST    Procedures:   * No surgery found *     Brief Admission History      Sammy Palmer is a 58-year-old male with a history of HFrEF, methamphetamine use presents to the emergency department in cardiac arrest. Per EMS, patient was found at home down with methamphetamine next to him after unknown time while at family's house. CPR was initiated by family at 1600 when patient was found and EMS arrived and continued CPR for 40 mins total starting at 1633.  Patient received 5 shocks, 5 epi, and for Narcan (2 IV and to IM) in the field.  Upon arrival to the emergency department, 2 synchronized cardioversions of 120 joules and 150 joules respectively due to persistent ventricular tachycardia and 1 mg of epinephrine as well as an amp of bicarb administered.  Achieved ROSC after 10 mins of ACLS in the ED.  CMP showed a bicarb of 15, creatinine of 1.61, glucose 330, and calcium of 8.1.  AST and ALT within normal limits.  Initial lactic acid level 7.9. UDS was positive for methamphetamines. Initial ABG showed 7.29, PCO2 43, HCO3 20.7. initial troponin 0.302, CK-MB 10.9, , lactic acid 7.9, PT/INR 17.2/1.4. , CT head w/o contrast showed patchy hypoattenuation in the cerebral white matter which is nonspecific but most commonly associated with chronic small vessel ischemic changes.     Hospital Course with Pertinent Findings     Received a page from the nurse that Mr. Sammy Palmer's telemetry monitor was showing asystole. On arrival at bedside, he was unresponsive to verbal,  tactile or painful stimuli. He was not moving any of his extremities spontaneously. He had non-palpable bilateral  Carotid, Radial, Femoral, and DP pulses. On auscultation of His precordium and anterior thorax, He did not have any audible heart sounds or breath sounds. He had absent pupillary light reflexes as well as absent corneal reflexes bilaterally. He was pronounced  at 19:14 on 3/23/24. Cause of death was cardiopulmonary arrest from anoxic brain injury and acute renal failure in setting of drug overdose.    Family was at bedside and informed of the patient's passing.     Time of Death: 19:14  Date of Death: 3/23/24    Attending physician notified.    TIME SPENT ON DISCHARGE: 60 minutes      Discharge Information:   Mr. Sammy Palmer is being discharged .      Pb Rodriguez MD  Internal Medicine PGY-III

## 2024-04-15 PROBLEM — Z08 ENCOUNTER FOR FOLLOW-UP SURVEILLANCE OF NEUROENDOCRINE CARCINOMA: Status: RESOLVED | Noted: 2024-01-01 | Resolved: 2024-04-15

## 2024-04-15 PROBLEM — Z85.89 ENCOUNTER FOR FOLLOW-UP SURVEILLANCE OF NEUROENDOCRINE CARCINOMA: Status: RESOLVED | Noted: 2024-01-01 | Resolved: 2024-04-15

## 2025-05-24 NOTE — PROGRESS NOTES
24-May-2025 History:  Past Medical History:   Diagnosis Date    Essential (primary) hypertension     Hepatitis a without hepatic coma    Social history:  Single.  Lives in [place], [place].  Has 3 children.  Not working for last 1 year; previously, used to work in construction.  Has been smoking < a pack of cigarettes daily for 10-15 years.  Used to drink 24 beers daily; drank for 7-8 years, quit 20 years ago.  No illicit drugs.    Family history:  Maternal grandmother experience breast cancer in her 40s.  Health maintenance:  Has no PCP.  Amenable to be referred to Internal Medicine, [facility], to establish with a PCP.  Past Surgical History:   Procedure Laterality Date    COLON RESECTION  12/22/2022    INGUINAL HERNIA REPAIR        Social History     Socioeconomic History    Marital status: Single   Tobacco Use    Smoking status: Every Day     Current packs/day: 0.50     Average packs/day: 0.5 packs/day for 38.9 years (19.5 ttl pk-yrs)     Types: Cigarettes     Start date: 1985    Smokeless tobacco: Never   Substance and Sexual Activity    Alcohol use: Not Currently     Comment: no drinking x 20 years    Drug use: Yes     Types: Methamphetamines     Comment: last use 2 days ago, 11/13/2023    Sexual activity: Yes     Partners: Female     Birth control/protection: None      No family history on file.     Reason for Follow-up:  -well-differentiated neuroendocrine tumor, G3, of right colon/cecum, S/P colectomy 12/18/2022, pT3 pN1 pG3 pR0, at least stage IIIB, MMR proficient  -7 mm lesion right hepatic lobe on Cts  -lung nodules    History of Present Illness:   No chief complaint on file.        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 Toribio Gómez, [facility], with colon cancer.    Presented to the emergency department with complaints of some right groin pain.  On workup, found to have a large malignant appearing cecal mass.  Colonoscopy confirmed a large mass.  Mass was causing symptoms in the form of pain and neuralgia.  Has a 7 mm nodule in liver which will need further workup on repeat imaging versus biopsy.    10/18/2022:  CT chest with IV contrast (staging evaluation):  -no evidence of metastatic disease in chest   -small sliding hiatal hernia with wall thickening of distal esophagus which may    12/15/2022: CT A/P with IV contrast only (left lower quadrant abdominal pain):  -moderate to severe masslike thickening seen of cecum, can not rule out underlying neoplastic mass  -7 mm hypodense lesion right hepatic lobe, too small to fully characterize; triphasic CT or MRI could be considered to exclude underlying metastasis    12/17/2022: Colonoscopy (Dr. Jericho Salazar MD) (cecal mass on CT scan):  -cecum:  Large ulcerated mass encompassing 50% of circumference of the lumen on the opposite wall of the IC valve  -ascending colon, transverse colon, descending colon normal; sigmoid colon normal; rectum normal    12/18/2022:  Laparoscopic converted to open right colectomy:   -right lower quadrant showed large malignant-appearing mass arising through the colon and apparently growing into the anterior abdominal wall, requiring transition to open incision (open right colectomy)    Pathology:  -right:, partial colectomy; high-grade colonic neuroendocrine tumor; tumor site, right colon; 8.3 x 7.3 x 6.2 cm; no macroscopic tumor perforation; tumor extends through the muscularis propria; proximal and distal margins free (distance from closest margin 2.1 cm from the mesenteric/radial margin); no lymphovascular invasion; no perineural invasion; no tumor deposits; 1/24 mesenteric  lymph nodes + for metastasis  >> pT3 pN1 pG3 pR0    No DNA mismatch repair abnormality noted (MMR proficient).  Low probability of microsatellite instability-high     Comment:  The specimen had an infection of a large bulky mass of colonic adenocarcinoma.  Possibilities include this to be simply a routine colonic adenocarcinoma or a neuroendocrine tumor.  Immunostains were performed.  This represents a high-grade colonic neuroendocrine tumor.  Well-differentiated neuroendocrine tumor, G3 (NET, G3)    -02/02/2023: CT A/P with IV contrast pelvic comparison:  12/15/2022):  -postsurgical changes from cecal mass resection  -no evidence of metastatic disease    02/06/2023:   Presents for initial medical oncology visit.  Accompanied by his sister and daughter.  Pleasant gentleman. , in no acute discomfort.    Overall, feels well.  Recovering from surgery.  Occasional constipation and diarrhea.  No blood in stool.  No abdominal pain.  No fevers or chills.  Some generalized weakness and fatigue.  Appetite is fluctuating.  ECOG 1.  No chest pain, cough, or dyspnea.  No flushing or wheezing.  No dizzy spells.        Interval History:  [No matching plan found]   [No matching plan found]   12/11/2023:   -06/14/2023: CT abdomen pelvis with contrast ([facility]) (abdominal pain) (comparison:  CT abdomen pelvis with contrast 05/04/2023):  1. Urinary bladder wall thickening can be correlated with urinalysis.   2. No additional acute abdominopelvic pathology.   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  His surveillance CT scans are pending.  Today, he is in excruciating mid abdominal pain.  No nausea or vomiting.  Denies constipation or diarrhea.  No blood in stool.  No fevers or chills.  Says that for last 1 month, he has been having intermittent crampy abdominal pains, mid abdominal, 20/10 on a scale of 1-10, especially having bowel movements.  Today, excruciating mid abdominal pain.  We will send him to the ER for evaluation.    Medications:  Current Outpatient Medications on File Prior to Visit   Medication Sig Dispense Refill    furosemide (LASIX) 20 MG tablet Take 40 mg by mouth.      hyoscyamine (LEVSIN/SL) 0.125 mg Subl Place 0.125 mg under the tongue every 4 (four) hours as needed.      lisinopriL (PRINIVIL,ZESTRIL) 20 MG tablet Take 20 mg by mouth once daily.      ondansetron (ZOFRAN-ODT) 4 MG TbDL Take 4 mg by mouth every 8 (eight) hours as needed.      promethazine (PHENERGAN) 25 MG tablet Take 25 mg by mouth every 6 (six) hours as needed.      SYMBICORT 160-4.5 mcg/actuation HFAA Inhale into the lungs.       No current facility-administered medications on file prior to visit.       Review of Systems:   All systems reviewed and found to be negative except for the symptoms detailed above    Physical Examination:   VITAL SIGNS:   Vitals:    12/11/23 1412   BP: (!) 160/100   Pulse: 92   Temp: 97.9 °F (36.6 °C)     GENERAL:  In no apparent distress.    HEAD:  No signs of head trauma.  EYES:  Pupils are equal.  Extraocular motions intact.    EARS:  Hearing grossly intact.  MOUTH:  Oropharynx is normal.   NECK:  No adenopathy, no JVD.     CHEST:   "Chest with clear breath sounds bilaterally.  No wheezes, rales, rhonchi.    CARDIAC:  Regular rate and rhythm.  S1 and S2, without murmurs, gallops, rubs.  VASCULAR:  No Edema.  Peripheral pulses normal and equal in all extremities.  ABDOMEN:  Soft, without detectable tenderness.  No sign of distention.  No   rebound or guarding, and no masses palpated.   Bowel Sounds normal.  MUSCULOSKELETAL:  Good range of motion of all major joints. Extremities without clubbing, cyanosis or edema.    NEUROLOGIC EXAM:  Alert and oriented x 3.  No focal sensory or strength deficits.   Speech normal.  Follows commands.  PSYCHIATRIC:  Mood normal.  12/11/2023: Excruciating mid abdominal pain; no peritoneal sign; voluntary guarding; no distention; we are going to send the patient to ER immediately for evaluation.    No results for input(s): "CBC" in the last 72 hours.   No results for input(s): "CMP" in the last 72 hours.     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    Primary malignant neuroendocrine tumor of cecum    Neuroendocrine neoplasm of colon - Primary       GI    Liver lesion, right lobe     Well-differentiated, high-grade (NET, G3) neuroendocrine carcinoma of right colon/cecum:   -7 mm hypodense lesion right hepatic lobe on CT A/P with IV contrast 12/15/2022 (not visualized on subsequent CT A/P with contrast 02/14/2023)  -no metastases on CT chest 10/18/2022 and CT A/P 12/15/2022 (except for 7 mm lesion right hepatic lobe)  -S/P colonoscopy 12/17/2022:  Large ulcerated mass encompassing 50% of circumference of the lumen on the opposite side of the IC valve  -S/P right partial colectomy 12/18/2022  -tumor 8.3 x 7.3 x 6.2 cm; pT3, etc.  -1/24 mesenteric lymph nodes + for metastasis  -pT3 pN1 M0 pG3 pR0, AJCC prognostic stage IIIB  -MMR proficient     -no metastases on staging CTs C/A/P with contrast " 02/14/2023   -7 mm hypodense lesion right hepatic lobe noted on CT A/P with IV contrast 12/15/2022, not visualized on CTs A/P with contrast 02/14/2023  -multiple scattered small lung nodules on CT chest 02/14/2023, 3-4.5 mm  -medicaid denied our request for SSTR-PET/CT (68 gallium DOTATATE; or 64Cu Dotatate; or 68 gallium  -02/17/2023:  24 hour urine 5-HIAA level normal  -05/04/2023: [facility]:  Right lower quadrant abdominal pain radiating into groin; underwent scrotal exploration, excision of testicular cysts on 05/05/2023; no malignancy; no testicular torsion or ultrasound  -no recurrence or disease progression on surveillance CTs C/A/P 05/22/2023  -CT A/P 06/14/2023: Abdominal pain:  Negative      Hepatitis-C:  -diagnosed 06/2023  -07/06/2023:  Hepatitis-A antibody IgG negative; hepatitis-B core antibody total negative; hepatitis-B surface antibody negative; hepatitis-B surface antigen negative; HIV negative; syphilis antibody negative   -07/06/2023:  No activity on ActiTest (no necroinflammatory activity)  -07/06/2023:  No fibrosis on FibroTest (no liver fibrosis)  -07/06/2020:  HCV RNA 73393 IU/mL; genotype 2   (was diagnosed with hepatitis-C 06/2023; treatment naive; 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  Send the patient to ER for evaluation.  Follow-up visit with me in 3 weeks with reports.  ----------------------------      12/11/2023:  Severe abdominal pain.  Send the patient to  ER for evaluation.    Locoregional disease  Does not need any adjuvant therapy     Need surveillance:    Every 3-6 months for 2 years (12/2022-12/2024), then every 6-12 months for 10 years with history and physical; abdominal/pelvic MRI with contrast or abdominal/pelvic multiphasic CT; CT chest as clinically indicated  >>>  -no recurrence or disease progression on surveillance CTs C/A/P 05/22/2023  -CT A/P 06/14/2023: Abdominal pain:  Negative  >>>  For surveillance, repeat multiphasic contrast-enhanced CT scans of A/P, and contrast-enhanced chest CT in 6 months (December)  Repeat CBC, CMP, 24 hour urine for 5-HIAA in 6 months (November)    Hepatitis C, treated with Epclusa  Follow-up with ID    Follow-up visit with me in 3 weeks with reports.    Above discussed with him.  All questions answered.    Discussed labs and scans and gave him copies of relevant records.    Plan of surveillance discussed.    He understands and agrees with this plan.    Follow-up:  No follow-ups on file.